# Patient Record
Sex: MALE | Race: WHITE | NOT HISPANIC OR LATINO | Employment: UNEMPLOYED | URBAN - METROPOLITAN AREA
[De-identification: names, ages, dates, MRNs, and addresses within clinical notes are randomized per-mention and may not be internally consistent; named-entity substitution may affect disease eponyms.]

---

## 2020-11-30 ENCOUNTER — HOSPITAL ENCOUNTER (EMERGENCY)
Facility: HOSPITAL | Age: 55
Discharge: HOME | End: 2020-11-30
Attending: NURSE PRACTITIONER | Admitting: EMERGENCY MEDICINE

## 2020-11-30 ENCOUNTER — APPOINTMENT (OUTPATIENT)
Dept: GENERAL RADIOLOGY | Facility: HOSPITAL | Age: 55
End: 2020-11-30

## 2020-11-30 VITALS
WEIGHT: 175.6 LBS | OXYGEN SATURATION: 98 % | BODY MASS INDEX: 26.01 KG/M2 | RESPIRATION RATE: 18 BRPM | HEIGHT: 69 IN | DIASTOLIC BLOOD PRESSURE: 88 MMHG | SYSTOLIC BLOOD PRESSURE: 152 MMHG | HEART RATE: 68 BPM | TEMPERATURE: 98.2 F

## 2020-11-30 DIAGNOSIS — M25.561 PAIN AND SWELLING OF RIGHT KNEE: ICD-10-CM

## 2020-11-30 DIAGNOSIS — M25.461 PAIN AND SWELLING OF RIGHT KNEE: ICD-10-CM

## 2020-11-30 DIAGNOSIS — M25.561 CHRONIC PAIN OF RIGHT KNEE: Primary | ICD-10-CM

## 2020-11-30 DIAGNOSIS — G89.29 CHRONIC PAIN OF RIGHT KNEE: Primary | ICD-10-CM

## 2020-11-30 PROCEDURE — 73564 X-RAY EXAM KNEE 4 OR MORE: CPT

## 2020-11-30 PROCEDURE — 99283 EMERGENCY DEPT VISIT LOW MDM: CPT

## 2020-11-30 RX ORDER — DICLOFENAC SODIUM 75 MG/1
75 TABLET, DELAYED RELEASE ORAL 2 TIMES DAILY PRN
Qty: 20 TABLET | Refills: 0 | Status: SHIPPED | OUTPATIENT
Start: 2020-11-30 | End: 2020-12-30

## 2020-11-30 RX ORDER — HYDROCODONE BITARTRATE AND ACETAMINOPHEN 5; 325 MG/1; MG/1
1 TABLET ORAL ONCE AS NEEDED
Status: DISCONTINUED | OUTPATIENT
Start: 2020-11-30 | End: 2020-11-30 | Stop reason: HOSPADM

## 2020-11-30 RX ADMIN — HYDROCODONE BITARTRATE AND ACETAMINOPHEN 1 TABLET: 5; 325 TABLET ORAL at 17:11

## 2020-12-30 ENCOUNTER — OFFICE VISIT (OUTPATIENT)
Dept: FAMILY MEDICINE CLINIC | Facility: CLINIC | Age: 55
End: 2020-12-30

## 2020-12-30 VITALS
HEIGHT: 67 IN | BODY MASS INDEX: 28.09 KG/M2 | SYSTOLIC BLOOD PRESSURE: 148 MMHG | HEART RATE: 93 BPM | OXYGEN SATURATION: 97 % | TEMPERATURE: 97.3 F | WEIGHT: 179 LBS | DIASTOLIC BLOOD PRESSURE: 95 MMHG

## 2020-12-30 DIAGNOSIS — M25.561 CHRONIC PAIN OF RIGHT KNEE: Primary | ICD-10-CM

## 2020-12-30 DIAGNOSIS — I10 ESSENTIAL HYPERTENSION: ICD-10-CM

## 2020-12-30 DIAGNOSIS — Z12.5 ENCOUNTER FOR SCREENING FOR MALIGNANT NEOPLASM OF PROSTATE: ICD-10-CM

## 2020-12-30 DIAGNOSIS — R06.02 SHORTNESS OF BREATH: ICD-10-CM

## 2020-12-30 DIAGNOSIS — G89.29 CHRONIC PAIN OF RIGHT KNEE: Primary | ICD-10-CM

## 2020-12-30 PROCEDURE — 99203 OFFICE O/P NEW LOW 30 MIN: CPT | Performed by: FAMILY MEDICINE

## 2020-12-30 NOTE — PROGRESS NOTES
"Subjective   Miguel Leung is a 55 y.o. male.     He is in as a new patient to [facility], not having seen a PCP in many years.  He is currently homeless with limited prospects of being able to find shelter.  He is not willing to go to a shelter at this time. He smokes and drinks a little and does not want to stop those activities, which would be necessary to stay in a shelter.  He has been short of breath with even modest activity.  The person with him today indicated he got short of breath walking from the car to our front door.  He denies chest pain or nausea.  He denies dizziness or lightheadedness.  His main complaint is some severe right knee pain that has been present for years.  He said he saw a physician locally that was unable to help him and he would like a referral to a doctor in [place] that he has been advised to see by a friend.  The knee does seem to limit his mobility and make it difficult for him to get around.  He is on disability.  He says the biggest reason he cannot get housing is because he does not have a legal identification document.  He was born in [place] while his father was in the service and he has no birth certificate.  He is on disability and has Medicaid and food stamps, but he is unable to secure housing or even assistance without proper documentation.         /95 (BP Location: Left arm, Patient Position: Sitting, Cuff Size: Large Adult)   Pulse 93   Temp 97.3 °F (36.3 °C) (Infrared)   Ht 170.2 cm (67\")   Wt 81.2 kg (179 lb)   SpO2 97%   BMI 28.04 kg/m²       Chief Complaint   Patient presents with   • Knee Pain     new pt- friend Elida Muhammad is with him today- she would like to get him a referral to Dr.Brent Andrea with [facility] - right knee- has seen Dr. Alvarez and had injection in his knee    • Shortness of Breath     hard time breathing with activity          No current outpatient medications on file.        The following portions of the patient's " history were reviewed and updated as appropriate: allergies, current medications, past family history, past medical history, past social history, past surgical history and problem list.    Review of Systems   Constitutional: Negative for activity change, fatigue and fever.   HENT: Negative for congestion, sinus pressure, sinus pain, sore throat and trouble swallowing.    Eyes: Negative for visual disturbance.   Respiratory: Positive for shortness of breath and wheezing. Negative for chest tightness.    Cardiovascular: Negative for chest pain.   Gastrointestinal: Negative for abdominal distention, abdominal pain, constipation, diarrhea, nausea and vomiting.   Genitourinary: Negative for difficulty urinating, dysuria, frequency and urgency.   Musculoskeletal: Positive for arthralgias and gait problem. Negative for back pain and neck pain.   Neurological: Negative for dizziness, weakness, light-headedness and numbness.   Psychiatric/Behavioral: Negative for agitation, hallucinations and suicidal ideas.       Objective   Physical Exam  Vitals signs and nursing note reviewed.   Eyes:      Conjunctiva/sclera: Conjunctivae normal.      Pupils: Pupils are equal, round, and reactive to light.   Neck:      Musculoskeletal: Neck supple.   Cardiovascular:      Rate and Rhythm: Normal rate and regular rhythm.      Heart sounds: Normal heart sounds.   Pulmonary:      Effort: Pulmonary effort is normal.      Breath sounds: Wheezing present. No rales.   Abdominal:      General: Bowel sounds are normal.      Palpations: Abdomen is soft.      Tenderness: There is no abdominal tenderness. There is no guarding.   Musculoskeletal:      Right lower leg: No edema.      Left lower leg: No edema.      Comments: Severely arthritic and limited in the right knee, using walking stick to help secure his gait   Neurological:      General: No focal deficit present.      Mental Status: He is alert and oriented to person, place, and time.    Psychiatric:         Mood and Affect: Mood normal.           Assessment/Plan   Problems Addressed this Visit     None      Visit Diagnoses     Chronic pain of right knee    -  Primary    Relevant Orders    Ambulatory Referral to Orthopedic Surgery    Shortness of breath        Relevant Orders    Comprehensive metabolic panel    Lipid panel    CBC w AUTO Differential    XR Chest PA & Lateral    Encounter for screening for malignant neoplasm of prostate        Relevant Orders    PSA SCREENING    Essential hypertension          Diagnoses       Codes Comments    Chronic pain of right knee    -  Primary ICD-10-CM: M25.561, G89.29  ICD-9-CM: 719.46, 338.29     Shortness of breath     ICD-10-CM: R06.02  ICD-9-CM: 786.05     Encounter for screening for malignant neoplasm of prostate     ICD-10-CM: Z12.5  ICD-9-CM: V76.44     Essential hypertension     ICD-10-CM: I10  ICD-9-CM: 401.9         I will refer to orthopedics for the knee  I will get a chest x-ray and some labs to look into the shortness of breath  I asked him to quit smoking and curtail drinking  I advised him and his friend on resources they could look into for shelter  I will see him back in 6 weeks or so  Once I see his labs and x-ray I will treat the blood pressure that was elevated  He is to call for new concerns  I hope he will come back for the follow-up

## 2021-12-27 ENCOUNTER — OFFICE VISIT (OUTPATIENT)
Dept: FAMILY MEDICINE CLINIC | Facility: CLINIC | Age: 56
End: 2021-12-27

## 2021-12-27 VITALS
SYSTOLIC BLOOD PRESSURE: 149 MMHG | HEART RATE: 95 BPM | HEIGHT: 67 IN | TEMPERATURE: 98.1 F | WEIGHT: 178 LBS | BODY MASS INDEX: 27.94 KG/M2 | OXYGEN SATURATION: 98 % | DIASTOLIC BLOOD PRESSURE: 90 MMHG

## 2021-12-27 DIAGNOSIS — M25.561 CHRONIC PAIN OF RIGHT KNEE: ICD-10-CM

## 2021-12-27 DIAGNOSIS — Z00.00 PREVENTATIVE HEALTH CARE: Primary | ICD-10-CM

## 2021-12-27 DIAGNOSIS — G89.29 CHRONIC PAIN OF RIGHT KNEE: ICD-10-CM

## 2021-12-27 DIAGNOSIS — I10 ESSENTIAL HYPERTENSION: ICD-10-CM

## 2021-12-27 PROCEDURE — 99214 OFFICE O/P EST MOD 30 MIN: CPT | Performed by: NURSE PRACTITIONER

## 2021-12-27 RX ORDER — LISINOPRIL 10 MG/1
10 TABLET ORAL DAILY
Qty: 30 TABLET | Refills: 0 | Status: SHIPPED | OUTPATIENT
Start: 2021-12-27

## 2021-12-27 RX ORDER — IBUPROFEN 200 MG
400 TABLET ORAL EVERY 6 HOURS PRN
COMMUNITY

## 2021-12-27 NOTE — PROGRESS NOTES
"Subjective     Miguel Leung is a 56 y.o. male.     Pt is here today with c/o of right knee pain and wants blood work completed  He is homeless and has limited means.   He has had chronic right knee pain.  States he used to get cortisone injections in the past.  He states that the pain is constant.  Rates the pain about 7/10.  He has been taking ibuprofen which doesn't really help.   He states that he knee gives out on him. He wears a stability brace on the knee.  Denies numbness or tingling in his foot.   He is smoking 1/2 ppd.  Smokes marijuana occasionally.    HTN- Drinks alcohol about 1-2 days a week. Has about 1-2 pints a week. Has occasional SOA but primarily from knee pain.          The following portions of the patient's history were reviewed and updated as appropriate: allergies, current medications, past family history, past medical history, past social history, past surgical history and problem list.    Review of Systems   Constitutional: Positive for fatigue. Negative for chills and fever.   Respiratory: Positive for shortness of breath. Negative for chest tightness.    Cardiovascular: Negative for chest pain and palpitations.   Gastrointestinal: Negative for abdominal pain, nausea and vomiting.   Musculoskeletal: Positive for arthralgias (right knee).   Neurological: Negative for dizziness, numbness and headache.       Objective     /90 (BP Location: Left arm, Patient Position: Sitting, Cuff Size: Large Adult)   Pulse 95   Temp 98.1 °F (36.7 °C)   Ht 170.2 cm (67\")   Wt 80.7 kg (178 lb)   SpO2 98%   BMI 27.88 kg/m²     Current Outpatient Medications on File Prior to Visit   Medication Sig Dispense Refill   • ibuprofen (ADVIL,MOTRIN) 200 MG tablet Take 400 mg by mouth Every 6 (Six) Hours As Needed for Mild Pain .       No current facility-administered medications on file prior to visit.        Physical Exam  Vitals reviewed.   Constitutional:       General: He is not in acute distress.     " Appearance: Normal appearance. He is well-developed. He is not diaphoretic.      Comments: dissheveled   HENT:      Head: Normocephalic and atraumatic.   Eyes:      General:         Right eye: No discharge.         Left eye: No discharge.      Conjunctiva/sclera: Conjunctivae normal.      Pupils: Pupils are equal, round, and reactive to light.   Cardiovascular:      Rate and Rhythm: Normal rate and regular rhythm.   Pulmonary:      Effort: Pulmonary effort is normal. No respiratory distress.      Breath sounds: Normal breath sounds. No wheezing or rales.   Abdominal:      General: Bowel sounds are normal. There is no distension.      Palpations: Abdomen is soft.      Tenderness: There is no abdominal tenderness.   Musculoskeletal:         General: Tenderness (right medial knee) present. Normal range of motion.      Cervical back: Normal range of motion and neck supple.   Skin:     General: Skin is warm and dry.   Neurological:      General: No focal deficit present.      Mental Status: He is alert and oriented to person, place, and time.   Psychiatric:         Mood and Affect: Mood normal.         Behavior: Behavior normal.         Thought Content: Thought content normal.         Judgment: Judgment normal.           Assessment/Plan     Diagnoses and all orders for this visit:    1. Preventative health care (Primary)  Comments:  decrease alcohol intake and quit smoking  check labs  pt is homeless- has advocate with him today  Orders:  -     Comprehensive metabolic panel; Future  -     Lipid panel; Future  -     Hepatitis C Antibody; Future    2. Chronic pain of right knee  Comments:  chronic issue  cont ibuporfen and tylenol  referral to ortho  Orders:  -     Ambulatory Referral to Orthopedic Surgery    3. Essential hypertension  Comments:  elevated  advised to quit drinking  start lisinopril  check labs  f/u 1 mo  Orders:  -     lisinopril (PRINIVIL,ZESTRIL) 10 MG tablet; Take 1 tablet by mouth Daily.  Dispense: 30  tablet; Refill: 0

## 2021-12-27 NOTE — PATIENT INSTRUCTIONS
Start lisinopril   Quit drinking  Check blood work  Referral to orthopedic doctor  Follow up in 1 mo

## 2022-01-07 ENCOUNTER — OFFICE VISIT (OUTPATIENT)
Dept: ORTHOPEDIC SURGERY | Facility: CLINIC | Age: 57
End: 2022-01-07

## 2022-01-07 VITALS
WEIGHT: 178 LBS | BODY MASS INDEX: 27.94 KG/M2 | HEART RATE: 97 BPM | SYSTOLIC BLOOD PRESSURE: 135 MMHG | HEIGHT: 67 IN | DIASTOLIC BLOOD PRESSURE: 82 MMHG

## 2022-01-07 DIAGNOSIS — M17.12 PRIMARY OSTEOARTHRITIS OF LEFT KNEE: Primary | ICD-10-CM

## 2022-01-07 PROCEDURE — 99204 OFFICE O/P NEW MOD 45 MIN: CPT | Performed by: ORTHOPAEDIC SURGERY

## 2022-01-07 PROCEDURE — 20610 DRAIN/INJ JOINT/BURSA W/O US: CPT | Performed by: ORTHOPAEDIC SURGERY

## 2022-01-07 RX ADMIN — TRIAMCINOLONE ACETONIDE 80 MG: 40 INJECTION, SUSPENSION INTRA-ARTICULAR; INTRAMUSCULAR at 14:00

## 2022-01-07 RX ADMIN — LIDOCAINE HYDROCHLORIDE 2 ML: 10 INJECTION, SOLUTION EPIDURAL; INFILTRATION; INTRACAUDAL; PERINEURAL at 14:00

## 2022-01-07 NOTE — PROGRESS NOTES
Large Joint Arthrocentesis: R knee  Date/Time: 1/7/2022 2:00 PM  Consent given by: patient  Site marked: site marked  Timeout: Immediately prior to procedure a time out was called to verify the correct patient, procedure, equipment, support staff and site/side marked as required   Supporting Documentation  Indications: pain   Procedure Details  Location: knee - R knee  Needle size: 25 G  Medications administered: 80 mg triamcinolone acetonide 40 MG/ML; 2 mL lidocaine PF 1% 1 %  Patient tolerance: patient tolerated the procedure well with no immediate complications

## 2022-01-07 NOTE — PROGRESS NOTES
NEW VISIT    Patient: Miguel Leung  ?  YOB: 1965    MRN: [id number]  ?  Chief Complaint   Patient presents with   • Right Knee - Pain      ?  HPI: Patient presents to the office with increasing pain and discomfort of the right knee.  He has difficulty in going up and down the steps.  The pain is mostly limited to the medial aspect of the knee joint.  There is radiation of pain to the proximal tibia as well.  There is no instability of the knee as such.  He does have a progressive varus deformity of the knee.  Duration of complaints is about 6 to 7 years.  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  He has a very distinct limp at the end of the day.  Cross body activities bother the patient significantly.    Pain Location: right knee(s)  Radiation: none  Quality: dull, aching  Intensity/Severity: moderate  Duration: 6-7 years  Onset quality: gradual   Timing: intermittent  Aggravating Factors: kneeling, rising after sitting and squatting  Alleviating Factors: NSAID's  Previous Episodes: yes  Associated Symptoms: pain, swelling, clicking/popping, deformity  ADL Affected: ambulating  Previous Treatment: Anti-inflammatory medication.    This patient is a new patient.  This problem is new to this examiner.      Allergies: No Known Allergies    Medications:   Home Medications:  Current Outpatient Medications on File Prior to Visit   Medication Sig   • ibuprofen (ADVIL,MOTRIN) 200 MG tablet Take 400 mg by mouth Every 6 (Six) Hours As Needed for Mild Pain .   • lisinopril (PRINIVIL,ZESTRIL) 10 MG tablet Take 1 tablet by mouth Daily.     No current facility-administered medications on file prior to visit.     Current Medications:  Scheduled Meds:  PRN Meds:.    I have reviewed the patient's medical history in detail and updated the computerized patient record.  Review and summarization of old records include:    Past Medical  "History:   Diagnosis Date   • Hypertension    • Meningitis spinal      Past Surgical History:   Procedure Laterality Date   • SPINE SURGERY       Social History     Occupational History   • Not on file   Tobacco Use   • Smoking status: Current Every Day Smoker     Packs/day: 0.50     Types: Cigarettes   • Smokeless tobacco: Never Used   Vaping Use   • Vaping Use: Never used   Substance and Sexual Activity   • Alcohol use: Yes     Comment: occ    • Drug use: Not Currently   • Sexual activity: Defer      Social History     Social History Narrative   • Not on file     Family History   Family history unknown: Yes         Review of Systems  Constitutional: Negative for appetite change.   HENT: Negative.    Eyes: Negative.    Respiratory: Negative.    Cardiovascular: Negative.    Gastrointestinal: Negative.    Endocrine: Negative.    Genitourinary: Negative.    Musculoskeletal: See details of exam below.  Skin: Negative.    Allergic/Immunologic: Negative.    Hematological: Negative.    Psychiatric/Behavioral: Negative.         Wt Readings from Last 3 Encounters:   01/07/22 80.7 kg (178 lb)   12/27/21 80.7 kg (178 lb)   12/30/20 81.2 kg (179 lb)     Ht Readings from Last 3 Encounters:   01/07/22 170.2 cm (67\")   12/27/21 170.2 cm (67\")   12/30/20 170.2 cm (67\")     Body mass index is 27.88 kg/m².  Facility age limit for growth percentiles is 20 years.  Vitals:    01/07/22 1325   BP: 135/82   Pulse: 97         Physical Exam  Constitutional: Patient is oriented to person, place, and time. Appears well-developed and well-nourished.   HENT:   Head: Normocephalic and atraumatic.   Eyes: Conjunctivae and EOM are normal. Pupils are equal, round, and reactive to light.   Cardiovascular: Normal rate, regular rhythm, normal heart sounds and intact distal pulses.   Pulmonary/Chest: Effort normal and breath sounds normal.   Musculoskeletal:   See detailed exam below   Neurological: Alert and oriented to person, place, and time. No " sensory deficit. Coordination normal.   Skin: Skin is warm and dry. Capillary refill takes less than 2 seconds. No rash noted. No erythema.   Psychiatric: Patient has a normal mood and affect. His behavior is normal. Judgment and thought content normal.   Nursing note and vitals reviewed.      Ortho Exam:   Right knee (varus). Patient has crepitus throughout range of motion. Positive patellar grind test. Mild effusion. Lachman is negative. Pivot shift is negative. Anterior and posterior drawer signs are negative. Significant joint line tenderness is noted on the medial aspect of the knee. Patient has a varus orientation of the knee. There is fullness and tenderness in the Popliteal fossa. Mild distention of a Popliteal cyst is noted in this location. Range of motion in flexion is from 0-110 degrees. Neurovascular status is intact.  Dorsalis pedis and posterior tibial artery pulses are palpable. Common peroneal nerve function is well preserved. Patient's gait is cautious and antalgic. Skin and soft tissues are mildly swollen, consistent with synovitis and effusion. The patient has a significant limp with the first few steps after starting the gait cycle. Getting out of a chair takes a lot of effort due to pain on knee flexion.           Diagnostics:  xrays obtained today   right Knee X-Ray  Indication: Pain and discomfort on the medial aspect of the knee.  AP, Lateral views  Findings: There is a distinct loss of articular surface cartilage integrity.  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  no bony lesion  Soft tissues within normal limits  decreased joint spaces  Hardware appropriately positioned not applicable      no prior studies available for comparison.    This patient's x-ray report was graded according to the Kellgren and Ryan classification.  This took into account the joint space narrowing, osteophyte formation, sclerosis of the distal femur/proximal tibia along with  deformity of those bones.  The findings were indicative of K L grade 3.    X-RAY was ordered and reviewed by Rosalio Casiano MD    Assessment:  Diagnoses and all orders for this visit:    1. Primary osteoarthritis of left knee (Primary)          Procedures  ?    Plan    · Compression/brace to the knee to prevent it from buckling and giving out.  · Intra-articular steroid injection administered to the patient to the right knee from an anteromedial approach.  · Calcium and vitamin D for bone health.  · Discussed with the patient about the pathology of spontaneous osteonecrosis and the fact that he is most likely going to need knee replacement surgery.  This replacement surgery could be partial medial compartment knee replacement versus total knee arthroplasty.  · Tablet meloxicam 7.5 mg tab 1 p.o. daily for pain swelling and discomfort.  · Tablet Fosamax 70 mg tab 1 p.o. weekly for helping strengthen the integrity of the bone.  · Rest, ice, compression, and elevation (RICE) therapy  · Stretching and strengthening exercises of the quads and hamstrings.  · Time spent in the office today with the patient is 50 minutes going over treatment options specifically surgical intervention.  · OTC Tylenol 500-1000mg by mouth every 6 hours as needed for pain   · Follow up in 3 month(s)  The patient was seen today for preoperative discussion.  The patient has been tried on over-the-counter and prescription NSAID's despite the risks of anti-inflammatory bleeding, peptic ulcers and erosive gastritis with short term benefit only.  Braces have been prescribed for mechanical support.  Patient has been participating in an exercise program specifically targeting joint pain relief with limited benefit. Intraarticular injections have been used periodically with some but not complete relief of pain.  Ambulation aids have also been utilized.      · The details of the surgical procedure were explained including the location of probable incisions  and a description of the likely hardware/grafts to be used. The patient understands the likely convalescence after surgery as well as the rehabilitation required.  Also, we have thoroughly discussed with the patient the risks, benefits and alternatives to surgery.  Risks include but are not limited to the risk of infection, joint stiffness, limited range of motion, wound healing problems, scar tissue build up, myocardial infarction, stroke, blood clots (including DVT and/or pulmonary embolus along with the risk of death) neurologic and/or vascular injury, limb length discrepancy, fracture, dislocation, nonunion, malunion, continued pain and need for further surgery including hardware failure requiring revision.     Date of encounter: 01/07/2022   Rosalio Casiano MD

## 2022-01-16 RX ORDER — LIDOCAINE HYDROCHLORIDE 10 MG/ML
2 INJECTION, SOLUTION EPIDURAL; INFILTRATION; INTRACAUDAL; PERINEURAL
Status: COMPLETED | OUTPATIENT
Start: 2022-01-07 | End: 2022-01-07

## 2022-01-16 RX ORDER — TRIAMCINOLONE ACETONIDE 40 MG/ML
80 INJECTION, SUSPENSION INTRA-ARTICULAR; INTRAMUSCULAR
Status: COMPLETED | OUTPATIENT
Start: 2022-01-07 | End: 2022-01-07

## 2024-10-17 ENCOUNTER — OFFICE VISIT (OUTPATIENT)
Dept: CARDIOLOGY | Facility: CLINIC | Age: 59
End: 2024-10-17
Payer: MEDICAID

## 2024-10-17 VITALS — OXYGEN SATURATION: 97 % | HEART RATE: 71 BPM | SYSTOLIC BLOOD PRESSURE: 139 MMHG | DIASTOLIC BLOOD PRESSURE: 98 MMHG

## 2024-10-17 DIAGNOSIS — Z86.73 HISTORY OF CVA (CEREBROVASCULAR ACCIDENT): Primary | ICD-10-CM

## 2024-10-17 DIAGNOSIS — E78.5 DYSLIPIDEMIA: ICD-10-CM

## 2024-10-17 DIAGNOSIS — Q21.12 PFO (PATENT FORAMEN OVALE): ICD-10-CM

## 2024-10-17 DIAGNOSIS — I10 ESSENTIAL HYPERTENSION: ICD-10-CM

## 2024-10-17 RX ORDER — HYDROXYZINE PAMOATE 50 MG/1
CAPSULE ORAL
COMMUNITY
Start: 2024-10-06

## 2024-10-17 RX ORDER — SODIUM CHLORIDE 9 MG/ML
80 INJECTION, SOLUTION INTRAVENOUS CONTINUOUS
OUTPATIENT
Start: 2024-10-17 | End: 2024-10-18

## 2024-10-17 RX ORDER — ATORVASTATIN CALCIUM 80 MG/1
TABLET, FILM COATED ORAL
COMMUNITY
Start: 2024-10-04

## 2024-10-17 RX ORDER — NITROGLYCERIN 0.4 MG/1
0.4 TABLET SUBLINGUAL
COMMUNITY

## 2024-10-17 NOTE — PROGRESS NOTES
Cardiology Consult Note    Patient Identification:  Name: Miguel Leung  Age: 59 y.o.  Sex: male  :  1965  MRN: [id number]             Requesting Physician :  Sasha Devries APRN     Reason for Consultation / Chief Complaint :   Cardiac source of emboli for CVA    History of Present Illness:    Mr. Miguel Leung has PMH of    CVA  Hole in the heart possible PFO  Hypertension  History of drug abuse and homelessness  Current cigarette smoker    Sent here for evaluation of hole in the heart closure.  Patient is a poor historian.  Came from [facility] in [place] which reportedly is a shelter.  Patient does not know what medications he takes.  Patient denies any chest pain or shortness of breath.  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  Patient reportedly had a Monitor which did not reveal A-fib an echo which showed a hole in the heart probably PFO was recommended to have the hole closed.    Patient's arterial blood pressure is 139/98, heart rate 71, O2 sat of 97% on room air.      Assessment:  :    CVA  Hypertension  Cigarette smoker  Hole in the heart, possible PFO      Recommendations / Plan:        Will get records from [facility]  Reviewed EKG results with patient.  Counseled on smoking cessation.  Unfortunately patient does not know what medications he takes and he is facility has not sent any medications.  Will get records from [facility].  Will schedule a NATALIE to evalvate his hole in the heart.  Risk-benefit alternatives explained  Will check venous Dopplers.  Will follow-up after getting records and testing to consider further evaluation treatment.           Diagnosis Plan   1. History of CVA (cerebrovascular accident)  Duplex Venous Lower Extremity - Bilateral CAR    Adult Transesophageal Echo (NATALIE) W/ Cont if Necessary Per Protocol    sodium chloride 0.9 % infusion      2. Essential  hypertension  Duplex Venous Lower Extremity - Bilateral CAR    Adult Transesophageal Echo (NATALIE) W/ Cont if Necessary Per Protocol    sodium chloride 0.9 % infusion      3. Dyslipidemia  Duplex Venous Lower Extremity - Bilateral CAR    Adult Transesophageal Echo (NATALIE) W/ Cont if Necessary Per Protocol    sodium chloride 0.9 % infusion      4. PFO (patent foramen ovale)  Duplex Venous Lower Extremity - Bilateral CAR    Adult Transesophageal Echo (NATALIE) W/ Cont if Necessary Per Protocol    sodium chloride 0.9 % infusion                 Past Medical History:  Past Medical History:   Diagnosis Date    Hypertension     Meningitis spinal     Stroke      Past Surgical History:  Past Surgical History:   Procedure Laterality Date    SPINE SURGERY        Allergies:  No Known Allergies  Home Meds:  (Not in a hospital admission)    Current Meds:     Current Outpatient Medications:     atorvastatin (LIPITOR) 80 MG tablet, , Disp: , Rfl:     hydrOXYzine pamoate (VISTARIL) 50 MG capsule, , Disp: , Rfl:     nitroglycerin (NITROSTAT) 0.4 MG SL tablet, Place 1 tablet under the tongue Every 5 (Five) Minutes As Needed for Chest Pain. Take no more than 3 doses in 15 minutes., Disp: , Rfl:     ibuprofen (ADVIL,MOTRIN) 200 MG tablet, Take 400 mg by mouth Every 6 (Six) Hours As Needed for Mild Pain ., Disp: , Rfl:     lisinopril (PRINIVIL,ZESTRIL) 10 MG tablet, Take 1 tablet by mouth Daily., Disp: 30 tablet, Rfl: 0  Social History:   Social History     Tobacco Use    Smoking status: Every Day     Current packs/day: 0.50     Types: Cigarettes     Passive exposure: Current    Smokeless tobacco: Never   Substance Use Topics    Alcohol use: Yes     Comment: occ       Family History:  Family History   Family history unknown: Yes        Review of Systems : Review of Systems   Constitutional: Negative for malaise/fatigue.   Cardiovascular:  Negative for chest pain, dyspnea on exertion, leg swelling and palpitations.   Respiratory:  Negative for  cough and shortness of breath.    Gastrointestinal:  Negative for abdominal pain, nausea and vomiting.   Neurological:  Negative for dizziness, focal weakness, headaches, light-headedness and numbness.   All other systems reviewed and are negative.                Constitutional:  Heart Rate:  [71] 71  BP: (139)/(98) 139/98    Physical Exam   /98 (BP Location: Left arm, Patient Position: Sitting, Cuff Size: Large Adult)   Pulse 71   SpO2 97%   Physical Exam  General:  Appears in no acute distress  Eyes: Sclerae are anicteric,  conjunctivae are clear   HEENT:  No JVD. Thyroid not visibly enlarged. No mucosal pallor or cyanosis  Respiratory: Respirations regular and unlabored at rest.  Bilaterally good breath sounds with good air entry in all fields. No crackles, rubs or wheezes auscultated  Cardiovascular: S1,S2 Regular rate and rhythm. No murmur, rub or gallop auscultated. No pretibial pitting edema  Gastrointestinal: Abdomen soft, flat, nontender. Bowel sounds present.   Musculoskeletal:  No abnormal movements  Extremities: No digital clubbing or cyanosis  Skin: Color pink. Skin warm and dry to touch. No rashes  No xanthoma  Neuro: Alert and awake, no lateralizing deficits appreciated    Cardiographics  ECG: EKG tracing was  personally reviewed/interpreted by me    ECG 12 Lead    Date/Time: 10/17/2024 4:55 PM  Performed by: Dario Gaona MD    Authorized by: Dario Gaona MD  Comparison: not compared with previous ECG   Previous ECG: no previous ECG available  Comments: EKG done today reviewed/interpreted by me reveals sinus rhythm with a rate of 69 bpm, no comparison EKG available.             Telemetry:     Echocardiogram:       Imaging  Chest X-ray:   Imaging Results (Last 24 Hours)       ** No results found for the last 24 hours. **            Lab Review: I have reviewed the labs                                      Dario Gaona MD  10/17/2024, 16:55 EDT      EMR  Dragon/Transcription:   Dictated utilizing Dragon dictation

## 2024-10-17 NOTE — H&P (VIEW-ONLY)
Cardiology Consult Note    Patient Identification:  Name: Miguel Leung  Age: 59 y.o.  Sex: male  :  1965  MRN: [id number]             Requesting Physician :  Sasha Devries APRN     Reason for Consultation / Chief Complaint :   Cardiac source of emboli for CVA    History of Present Illness:    Mr. Miguel Leung has PMH of    CVA  Hole in the heart possible PFO  Hypertension  History of drug abuse and homelessness  Current cigarette smoker    Sent here for evaluation of hole in the heart closure.  Patient is a poor historian.  Came from [facility] in [place] which reportedly is a shelter.  Patient does not know what medications he takes.  Patient denies any chest pain or shortness of breath.  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  Patient reportedly had a Monitor which did not reveal A-fib an echo which showed a hole in the heart probably PFO was recommended to have the hole closed.    Patient's arterial blood pressure is 139/98, heart rate 71, O2 sat of 97% on room air.      Assessment:  :    CVA  Hypertension  Cigarette smoker  Hole in the heart, possible PFO      Recommendations / Plan:        Will get records from [facility]  Reviewed EKG results with patient.  Counseled on smoking cessation.  Unfortunately patient does not know what medications he takes and he is facility has not sent any medications.  Will get records from [facility].  Will schedule a NATALIE to evalvate his hole in the heart.  Risk-benefit alternatives explained  Will check venous Dopplers.  Will follow-up after getting records and testing to consider further evaluation treatment.           Diagnosis Plan   1. History of CVA (cerebrovascular accident)  Duplex Venous Lower Extremity - Bilateral CAR    Adult Transesophageal Echo (NATALIE) W/ Cont if Necessary Per Protocol    sodium chloride 0.9 % infusion      2. Essential  hypertension  Duplex Venous Lower Extremity - Bilateral CAR    Adult Transesophageal Echo (NATALIE) W/ Cont if Necessary Per Protocol    sodium chloride 0.9 % infusion      3. Dyslipidemia  Duplex Venous Lower Extremity - Bilateral CAR    Adult Transesophageal Echo (NATALIE) W/ Cont if Necessary Per Protocol    sodium chloride 0.9 % infusion      4. PFO (patent foramen ovale)  Duplex Venous Lower Extremity - Bilateral CAR    Adult Transesophageal Echo (NATALIE) W/ Cont if Necessary Per Protocol    sodium chloride 0.9 % infusion                 Past Medical History:  Past Medical History:   Diagnosis Date    Hypertension     Meningitis spinal     Stroke      Past Surgical History:  Past Surgical History:   Procedure Laterality Date    SPINE SURGERY        Allergies:  No Known Allergies  Home Meds:  (Not in a hospital admission)    Current Meds:     Current Outpatient Medications:     atorvastatin (LIPITOR) 80 MG tablet, , Disp: , Rfl:     hydrOXYzine pamoate (VISTARIL) 50 MG capsule, , Disp: , Rfl:     nitroglycerin (NITROSTAT) 0.4 MG SL tablet, Place 1 tablet under the tongue Every 5 (Five) Minutes As Needed for Chest Pain. Take no more than 3 doses in 15 minutes., Disp: , Rfl:     ibuprofen (ADVIL,MOTRIN) 200 MG tablet, Take 400 mg by mouth Every 6 (Six) Hours As Needed for Mild Pain ., Disp: , Rfl:     lisinopril (PRINIVIL,ZESTRIL) 10 MG tablet, Take 1 tablet by mouth Daily., Disp: 30 tablet, Rfl: 0  Social History:   Social History     Tobacco Use    Smoking status: Every Day     Current packs/day: 0.50     Types: Cigarettes     Passive exposure: Current    Smokeless tobacco: Never   Substance Use Topics    Alcohol use: Yes     Comment: occ       Family History:  Family History   Family history unknown: Yes        Review of Systems : Review of Systems   Constitutional: Negative for malaise/fatigue.   Cardiovascular:  Negative for chest pain, dyspnea on exertion, leg swelling and palpitations.   Respiratory:  Negative for  cough and shortness of breath.    Gastrointestinal:  Negative for abdominal pain, nausea and vomiting.   Neurological:  Negative for dizziness, focal weakness, headaches, light-headedness and numbness.   All other systems reviewed and are negative.                Constitutional:  Heart Rate:  [71] 71  BP: (139)/(98) 139/98    Physical Exam   /98 (BP Location: Left arm, Patient Position: Sitting, Cuff Size: Large Adult)   Pulse 71   SpO2 97%   Physical Exam  General:  Appears in no acute distress  Eyes: Sclerae are anicteric,  conjunctivae are clear   HEENT:  No JVD. Thyroid not visibly enlarged. No mucosal pallor or cyanosis  Respiratory: Respirations regular and unlabored at rest.  Bilaterally good breath sounds with good air entry in all fields. No crackles, rubs or wheezes auscultated  Cardiovascular: S1,S2 Regular rate and rhythm. No murmur, rub or gallop auscultated. No pretibial pitting edema  Gastrointestinal: Abdomen soft, flat, nontender. Bowel sounds present.   Musculoskeletal:  No abnormal movements  Extremities: No digital clubbing or cyanosis  Skin: Color pink. Skin warm and dry to touch. No rashes  No xanthoma  Neuro: Alert and awake, no lateralizing deficits appreciated    Cardiographics  ECG: EKG tracing was  personally reviewed/interpreted by me    ECG 12 Lead    Date/Time: 10/17/2024 4:55 PM  Performed by: Dario Gaona MD    Authorized by: Dario Gaona MD  Comparison: not compared with previous ECG   Previous ECG: no previous ECG available  Comments: EKG done today reviewed/interpreted by me reveals sinus rhythm with a rate of 69 bpm, no comparison EKG available.             Telemetry:     Echocardiogram:       Imaging  Chest X-ray:   Imaging Results (Last 24 Hours)       ** No results found for the last 24 hours. **            Lab Review: I have reviewed the labs                                      Dario Gaona MD  10/17/2024, 16:55 EDT      EMR  Dragon/Transcription:   Dictated utilizing Dragon dictation

## 2024-10-30 ENCOUNTER — TELEPHONE (OUTPATIENT)
Dept: CARDIOLOGY | Facility: CLINIC | Age: 59
End: 2024-10-30
Payer: MEDICAID

## 2024-10-30 ENCOUNTER — HOSPITAL ENCOUNTER (OUTPATIENT)
Dept: CARDIOLOGY | Facility: HOSPITAL | Age: 59
Discharge: HOME | End: 2024-10-30
Admitting: INTERNAL MEDICINE
Payer: MEDICAID

## 2024-10-30 DIAGNOSIS — E78.5 DYSLIPIDEMIA: ICD-10-CM

## 2024-10-30 DIAGNOSIS — Q21.12 PFO (PATENT FORAMEN OVALE): ICD-10-CM

## 2024-10-30 DIAGNOSIS — I10 ESSENTIAL HYPERTENSION: ICD-10-CM

## 2024-10-30 DIAGNOSIS — Z86.73 HISTORY OF CVA (CEREBROVASCULAR ACCIDENT): ICD-10-CM

## 2024-10-30 LAB
BH CV LOW VAS LEFT GASTRONEMIUS VESSEL: 1
BH CV LOWER VASCULAR LEFT COMMON FEMORAL AUGMENT: NORMAL
BH CV LOWER VASCULAR LEFT COMMON FEMORAL COMPETENT: NORMAL
BH CV LOWER VASCULAR LEFT COMMON FEMORAL COMPRESS: NORMAL
BH CV LOWER VASCULAR LEFT COMMON FEMORAL PHASIC: NORMAL
BH CV LOWER VASCULAR LEFT COMMON FEMORAL SPONT: NORMAL
BH CV LOWER VASCULAR LEFT DISTAL FEMORAL COMPRESS: NORMAL
BH CV LOWER VASCULAR LEFT GASTRONEMIUS COMPRESS: NORMAL
BH CV LOWER VASCULAR LEFT GASTRONEMIUS THROMBUS: NORMAL
BH CV LOWER VASCULAR LEFT GREATER SAPH AK COMPRESS: NORMAL
BH CV LOWER VASCULAR LEFT GREATER SAPH BK COMPRESS: NORMAL
BH CV LOWER VASCULAR LEFT LESSER SAPH COMPRESS: NORMAL
BH CV LOWER VASCULAR LEFT MID FEMORAL AUGMENT: NORMAL
BH CV LOWER VASCULAR LEFT MID FEMORAL COMPETENT: NORMAL
BH CV LOWER VASCULAR LEFT MID FEMORAL COMPRESS: NORMAL
BH CV LOWER VASCULAR LEFT MID FEMORAL PHASIC: NORMAL
BH CV LOWER VASCULAR LEFT MID FEMORAL SPONT: NORMAL
BH CV LOWER VASCULAR LEFT PERONEAL COMPRESS: NORMAL
BH CV LOWER VASCULAR LEFT POPLITEAL AUGMENT: NORMAL
BH CV LOWER VASCULAR LEFT POPLITEAL COMPETENT: NORMAL
BH CV LOWER VASCULAR LEFT POPLITEAL COMPRESS: NORMAL
BH CV LOWER VASCULAR LEFT POPLITEAL PHASIC: NORMAL
BH CV LOWER VASCULAR LEFT POPLITEAL SPONT: NORMAL
BH CV LOWER VASCULAR LEFT POSTERIOR TIBIAL COMPRESS: NORMAL
BH CV LOWER VASCULAR LEFT PROXIMAL FEMORAL COMPRESS: NORMAL
BH CV LOWER VASCULAR LEFT SAPHENOFEMORAL JUNCTION COMPRESS: NORMAL
BH CV LOWER VASCULAR RIGHT COMMON FEMORAL AUGMENT: NORMAL
BH CV LOWER VASCULAR RIGHT COMMON FEMORAL COMPETENT: NORMAL
BH CV LOWER VASCULAR RIGHT COMMON FEMORAL COMPRESS: NORMAL
BH CV LOWER VASCULAR RIGHT COMMON FEMORAL PHASIC: NORMAL
BH CV LOWER VASCULAR RIGHT COMMON FEMORAL SPONT: NORMAL
BH CV LOWER VASCULAR RIGHT DISTAL FEMORAL COMPRESS: NORMAL
BH CV LOWER VASCULAR RIGHT GASTRONEMIUS COMPRESS: NORMAL
BH CV LOWER VASCULAR RIGHT GREATER SAPH AK COMPRESS: NORMAL
BH CV LOWER VASCULAR RIGHT GREATER SAPH BK COMPRESS: NORMAL
BH CV LOWER VASCULAR RIGHT LESSER SAPH COMPRESS: NORMAL
BH CV LOWER VASCULAR RIGHT MID FEMORAL AUGMENT: NORMAL
BH CV LOWER VASCULAR RIGHT MID FEMORAL COMPETENT: NORMAL
BH CV LOWER VASCULAR RIGHT MID FEMORAL COMPRESS: NORMAL
BH CV LOWER VASCULAR RIGHT MID FEMORAL PHASIC: NORMAL
BH CV LOWER VASCULAR RIGHT MID FEMORAL SPONT: NORMAL
BH CV LOWER VASCULAR RIGHT PERONEAL COMPRESS: NORMAL
BH CV LOWER VASCULAR RIGHT POPLITEAL AUGMENT: NORMAL
BH CV LOWER VASCULAR RIGHT POPLITEAL COMPETENT: NORMAL
BH CV LOWER VASCULAR RIGHT POPLITEAL COMPRESS: NORMAL
BH CV LOWER VASCULAR RIGHT POPLITEAL PHASIC: NORMAL
BH CV LOWER VASCULAR RIGHT POPLITEAL SPONT: NORMAL
BH CV LOWER VASCULAR RIGHT POSTERIOR TIBIAL COMPRESS: NORMAL
BH CV LOWER VASCULAR RIGHT PROXIMAL FEMORAL COMPRESS: NORMAL
BH CV LOWER VASCULAR RIGHT SAPHENOFEMORAL JUNCTION COMPRESS: NORMAL
BH CV VAS PRELIMINARY FINDINGS SCRIPTING: 1

## 2024-10-30 PROCEDURE — 93970 EXTREMITY STUDY: CPT | Performed by: SURGERY

## 2024-10-30 PROCEDURE — 93970 EXTREMITY STUDY: CPT

## 2024-10-30 RX ORDER — APIXABAN 5 MG (74)
5 KIT ORAL SEE ADMIN INSTRUCTIONS
Qty: 74 TABLET | Refills: 0 | Status: SHIPPED | OUTPATIENT
Start: 2024-10-30

## 2024-10-30 NOTE — TELEPHONE ENCOUNTER
Called patient 3 times was hung up on the first time.no answer other 2 times.  Left message to return call regarding testing from today.      Patient has blood clot in his leg and needs anticoagulation      Called other number listed under patient's name which is some facility which he is not at.      Called emergency contact Verena which is patient's friend and on emergency contact release form she reports that patient does not have a phone currently.    Informed her of DVT need for anticoagulation  Eliquis starter pack sent to patient's pharmacy.      Discussed NATALIE that is scheduled on 11/8/2024

## 2024-11-08 ENCOUNTER — TELEPHONE (OUTPATIENT)
Dept: CARDIOLOGY | Facility: CLINIC | Age: 59
End: 2024-11-08
Payer: MEDICAID

## 2024-11-08 ENCOUNTER — HOSPITAL ENCOUNTER (OUTPATIENT)
Dept: CARDIOLOGY | Facility: HOSPITAL | Age: 59
Discharge: HOME | End: 2024-11-08
Payer: MEDICAID

## 2024-11-08 ENCOUNTER — ANESTHESIA EVENT (OUTPATIENT)
Dept: CARDIOLOGY | Facility: HOSPITAL | Age: 59
End: 2024-11-08
Payer: MEDICAID

## 2024-11-08 ENCOUNTER — ANESTHESIA (OUTPATIENT)
Dept: CARDIOLOGY | Facility: HOSPITAL | Age: 59
End: 2024-11-08
Payer: MEDICAID

## 2024-11-08 VITALS
BODY MASS INDEX: 29.05 KG/M2 | RESPIRATION RATE: 16 BRPM | HEART RATE: 65 BPM | HEIGHT: 65 IN | DIASTOLIC BLOOD PRESSURE: 93 MMHG | TEMPERATURE: 97.8 F | OXYGEN SATURATION: 100 % | WEIGHT: 174.38 LBS | SYSTOLIC BLOOD PRESSURE: 153 MMHG

## 2024-11-08 DIAGNOSIS — Z86.73 HISTORY OF CVA (CEREBROVASCULAR ACCIDENT): ICD-10-CM

## 2024-11-08 DIAGNOSIS — I10 ESSENTIAL HYPERTENSION: ICD-10-CM

## 2024-11-08 DIAGNOSIS — E78.5 DYSLIPIDEMIA: ICD-10-CM

## 2024-11-08 DIAGNOSIS — Q21.12 PFO (PATENT FORAMEN OVALE): ICD-10-CM

## 2024-11-08 DIAGNOSIS — I63.9 CRYPTOGENIC STROKE: Primary | Chronic | ICD-10-CM

## 2024-11-08 LAB
ANION GAP SERPL CALCULATED.3IONS-SCNC: 8.9 MMOL/L (ref 5–15)
BH CV ECHO SHUNT ASSESSMENT PERFORMED (HIDDEN SCRIPTING): 1
BUN SERPL-MCNC: 10 MG/DL (ref 8–23)
BUN/CREAT SERPL: 17.5 (ref 7–25)
CALCIUM SPEC-SCNC: 8.3 MG/DL (ref 8.6–10.5)
CHLORIDE SERPL-SCNC: 107 MMOL/L (ref 98–107)
CO2 SERPL-SCNC: 24.1 MMOL/L (ref 22–29)
CREAT SERPL-MCNC: 0.57 MG/DL (ref 0.76–1.27)
EGFRCR SERPLBLD CKD-EPI 2021: 111.5 ML/MIN/1.73
GLUCOSE SERPL-MCNC: 89 MG/DL (ref 65–99)
INR PPP: 0.87 (ref 0.9–1.1)
POTASSIUM SERPL-SCNC: 4.2 MMOL/L (ref 3.5–5.2)
PROTHROMBIN TIME: 11.9 SECONDS (ref 11.7–14.2)
SODIUM SERPL-SCNC: 140 MMOL/L (ref 136–145)

## 2024-11-08 PROCEDURE — 93321 DOPPLER ECHO F-UP/LMTD STD: CPT

## 2024-11-08 PROCEDURE — 25010000002 LIDOCAINE PF 2% 2 % SOLUTION: Performed by: NURSE ANESTHETIST, CERTIFIED REGISTERED

## 2024-11-08 PROCEDURE — 93325 DOPPLER ECHO COLOR FLOW MAPG: CPT

## 2024-11-08 PROCEDURE — 93312 ECHO TRANSESOPHAGEAL: CPT

## 2024-11-08 PROCEDURE — 80048 BASIC METABOLIC PNL TOTAL CA: CPT | Performed by: INTERNAL MEDICINE

## 2024-11-08 PROCEDURE — 93320 DOPPLER ECHO COMPLETE: CPT

## 2024-11-08 PROCEDURE — 25810000003 SODIUM CHLORIDE 0.9 % SOLUTION: Performed by: INTERNAL MEDICINE

## 2024-11-08 PROCEDURE — 85610 PROTHROMBIN TIME: CPT | Performed by: INTERNAL MEDICINE

## 2024-11-08 PROCEDURE — 25010000002 PROPOFOL 10 MG/ML EMULSION: Performed by: NURSE ANESTHETIST, CERTIFIED REGISTERED

## 2024-11-08 RX ORDER — HYDRALAZINE HYDROCHLORIDE 20 MG/ML
5 INJECTION INTRAMUSCULAR; INTRAVENOUS
OUTPATIENT
Start: 2024-11-08

## 2024-11-08 RX ORDER — NALOXONE HCL 0.4 MG/ML
0.4 VIAL (ML) INJECTION AS NEEDED
OUTPATIENT
Start: 2024-11-08

## 2024-11-08 RX ORDER — LIDOCAINE HYDROCHLORIDE 20 MG/ML
INJECTION, SOLUTION EPIDURAL; INFILTRATION; INTRACAUDAL; PERINEURAL AS NEEDED
Status: DISCONTINUED | OUTPATIENT
Start: 2024-11-08 | End: 2024-11-08 | Stop reason: SURG

## 2024-11-08 RX ORDER — SODIUM CHLORIDE 9 MG/ML
80 INJECTION, SOLUTION INTRAVENOUS CONTINUOUS
Status: DISCONTINUED | OUTPATIENT
Start: 2024-11-08 | End: 2024-11-09 | Stop reason: HOSPADM

## 2024-11-08 RX ORDER — ACETAMINOPHEN 325 MG/1
650 TABLET ORAL ONCE AS NEEDED
OUTPATIENT
Start: 2024-11-08

## 2024-11-08 RX ORDER — PROPOFOL 10 MG/ML
VIAL (ML) INTRAVENOUS AS NEEDED
Status: DISCONTINUED | OUTPATIENT
Start: 2024-11-08 | End: 2024-11-08 | Stop reason: SURG

## 2024-11-08 RX ORDER — LABETALOL HYDROCHLORIDE 5 MG/ML
5 INJECTION, SOLUTION INTRAVENOUS
OUTPATIENT
Start: 2024-11-08

## 2024-11-08 RX ORDER — ONDANSETRON 2 MG/ML
4 INJECTION INTRAMUSCULAR; INTRAVENOUS ONCE AS NEEDED
OUTPATIENT
Start: 2024-11-08

## 2024-11-08 RX ORDER — ACETAMINOPHEN 650 MG/1
650 SUPPOSITORY RECTAL EVERY 4 HOURS PRN
OUTPATIENT
Start: 2024-11-08

## 2024-11-08 RX ORDER — ALBUTEROL SULFATE 0.83 MG/ML
2.5 SOLUTION RESPIRATORY (INHALATION) ONCE AS NEEDED
OUTPATIENT
Start: 2024-11-08

## 2024-11-08 RX ORDER — DIPHENHYDRAMINE HYDROCHLORIDE 50 MG/ML
12.5 INJECTION INTRAMUSCULAR; INTRAVENOUS
OUTPATIENT
Start: 2024-11-08

## 2024-11-08 RX ADMIN — LIDOCAINE HYDROCHLORIDE 70 MG: 20 INJECTION, SOLUTION EPIDURAL; INFILTRATION; INTRACAUDAL; PERINEURAL at 10:56

## 2024-11-08 RX ADMIN — PROPOFOL 40 MG: 10 INJECTION, EMULSION INTRAVENOUS at 11:00

## 2024-11-08 RX ADMIN — PROPOFOL 70 MG: 10 INJECTION, EMULSION INTRAVENOUS at 10:56

## 2024-11-08 RX ADMIN — SODIUM CHLORIDE: 9 INJECTION, SOLUTION INTRAVENOUS at 10:44

## 2024-11-08 NOTE — DISCHARGE INSTRUCTIONS
NATALIE DC Instructions  The medication, which was used to put you to sleep, will be acting in your body for the next twenty-four (24) hours, so you might feel a little sleepy; this feeling will slowly wear off.  Because the medicine is still in your system for the next twenty-four (24) hours:  Do not drive a car, operate machinery, or power tools  Do not drink any alcoholic drinks (not even beer)  Make any important decisions such as to sign important papers    We strongly suggest that a responsible adult be with the patient the rest of the day.    What to do for pain: acetaminophen (Tylenol) and eating cool, soothing foods (e.g. ice cream, smoothies) can help with a sore throat. If your pain is unmanageable with these methods, call the Cardiologist's office.     It may be better to start with liquids such as water, then soups, and graduate up to solid foods  If medication was used to numb your throat, do not eat or drink anything for 2 hours.     Call the cardiologist with any problems of:  Excessive mucus  Spitting up blood  Sore throat more than 72 hours    Go to the nearest Emergency Department if you're vomiting blood or having difficulty breathing.

## 2024-11-08 NOTE — ANESTHESIA PREPROCEDURE EVALUATION
Anesthesia Evaluation     Patient summary reviewed   no history of anesthetic complications:   NPO Solid Status: > 8 hours  NPO Liquid Status: > 8 hours           Airway   Dental      Pulmonary    (+) a smoker Current,  Cardiovascular     ECG reviewed    (+) hypertension      Neuro/Psych  (+) CVA  GI/Hepatic/Renal/Endo      Musculoskeletal     Abdominal    Substance History      OB/GYN          Other   arthritis,                   Anesthesia Plan    ASA 3     general   total IV anesthesia  intravenous induction     Anesthetic plan, risks, benefits, and alternatives have been provided, discussed and informed consent has been obtained with: patient.    Plan discussed with CRNA.    CODE STATUS:

## 2024-11-08 NOTE — TELEPHONE ENCOUNTER
Patient had NATALIE today    Please schedule PFO closure on a Tuesday when Dr. Navarrete is available to do ICE with Dr. Gaona .

## 2024-11-08 NOTE — ANESTHESIA POSTPROCEDURE EVALUATION
Patient: Miguel Leung    Procedure Summary       Date: 11/08/24 Room / Location: [facility]    Anesthesia Start: 1054 Anesthesia Stop: 1104    Procedure: ADULT TRANSESOPHAGEAL ECHO (NATALIE) W/ CONT IF NECESSARY PER PROTOCOL Diagnosis:       History of CVA (cerebrovascular accident)      Essential hypertension      Dyslipidemia      PFO (patent foramen ovale)      (Cardiac Source of Emboli)    Scheduled Providers: Dario Gaona MD Provider: Verena Bustamante MD    Anesthesia Type: general ASA Status: 3            Anesthesia Type: general    Vitals  Vitals Value Taken Time   /93 11/08/24 1145   Temp     Pulse 67 11/08/24 1152   Resp     SpO2 100 % 11/08/24 1149   Vitals shown include unfiled device data.        Post Anesthesia Care and Evaluation    Patient location during evaluation: PACU  Patient participation: complete - patient participated  Level of consciousness: awake and alert  Pain management: satisfactory to patient    Airway patency: patent  Anesthetic complications: No anesthetic complications  PONV Status: none  Cardiovascular status: acceptable  Respiratory status: acceptable  Hydration status: acceptable

## 2024-11-11 NOTE — TELEPHONE ENCOUNTER
Patient is on Eliquis. Does this need to be stopped prior to procedure? Currently scheduled for 12/10. This is the first available date with Dr. Navarrete.

## 2024-11-12 ENCOUNTER — TELEPHONE (OUTPATIENT)
Dept: CARDIOLOGY | Facility: CLINIC | Age: 59
End: 2024-11-12
Payer: MEDICAID

## 2024-11-12 NOTE — TELEPHONE ENCOUNTER
LM for a call back to schedule patient for PFO closure. Procedure is already scheduled for 12/10 but I need to speak to patient and his caregiver. Left my direct number for them to call back.

## 2024-12-10 ENCOUNTER — HOSPITAL ENCOUNTER (OUTPATIENT)
Facility: HOSPITAL | Age: 59
Discharge: HOME | End: 2024-12-11
Attending: INTERNAL MEDICINE | Admitting: INTERNAL MEDICINE
Payer: MEDICAID

## 2024-12-10 ENCOUNTER — APPOINTMENT (OUTPATIENT)
Dept: GENERAL RADIOLOGY | Facility: HOSPITAL | Age: 59
End: 2024-12-10
Payer: MEDICAID

## 2024-12-10 DIAGNOSIS — I82.4Y2 ACUTE DEEP VEIN THROMBOSIS (DVT) OF PROXIMAL VEIN OF LEFT LOWER EXTREMITY: Primary | ICD-10-CM

## 2024-12-10 DIAGNOSIS — I63.9 CRYPTOGENIC STROKE: ICD-10-CM

## 2024-12-10 DIAGNOSIS — Q21.12 PFO (PATENT FORAMEN OVALE): ICD-10-CM

## 2024-12-10 LAB
ALBUMIN SERPL-MCNC: 4 G/DL (ref 3.5–5.2)
ALBUMIN/GLOB SERPL: 0.9 G/DL
ALP SERPL-CCNC: 92 U/L (ref 39–117)
ALT SERPL W P-5'-P-CCNC: 10 U/L (ref 1–41)
ANION GAP SERPL CALCULATED.3IONS-SCNC: 11.1 MMOL/L (ref 5–15)
AST SERPL-CCNC: 21 U/L (ref 1–40)
BASOPHILS # BLD AUTO: 0.04 10*3/MM3 (ref 0–0.2)
BASOPHILS NFR BLD AUTO: 0.3 % (ref 0–1.5)
BILIRUB SERPL-MCNC: 1.3 MG/DL (ref 0–1.2)
BUN SERPL-MCNC: 9 MG/DL (ref 8–23)
BUN/CREAT SERPL: 14.1 (ref 7–25)
CALCIUM SPEC-SCNC: 9.2 MG/DL (ref 8.6–10.5)
CHLORIDE SERPL-SCNC: 105 MMOL/L (ref 98–107)
CO2 SERPL-SCNC: 23.9 MMOL/L (ref 22–29)
CREAT SERPL-MCNC: 0.64 MG/DL (ref 0.76–1.27)
DEPRECATED RDW RBC AUTO: 46.5 FL (ref 37–54)
EGFRCR SERPLBLD CKD-EPI 2021: 107.7 ML/MIN/1.73
EOSINOPHIL # BLD AUTO: 0.13 10*3/MM3 (ref 0–0.4)
EOSINOPHIL NFR BLD AUTO: 1 % (ref 0.3–6.2)
ERYTHROCYTE [DISTWIDTH] IN BLOOD BY AUTOMATED COUNT: 13.7 % (ref 12.3–15.4)
GLOBULIN UR ELPH-MCNC: 4.7 GM/DL
GLUCOSE SERPL-MCNC: 95 MG/DL (ref 65–99)
HCT VFR BLD AUTO: 48.5 % (ref 37.5–51)
HGB BLD-MCNC: 16.2 G/DL (ref 13–17.7)
IMM GRANULOCYTES # BLD AUTO: 0.09 10*3/MM3 (ref 0–0.05)
IMM GRANULOCYTES NFR BLD AUTO: 0.7 % (ref 0–0.5)
INR PPP: 0.99 (ref 0.9–1.1)
LYMPHOCYTES # BLD AUTO: 2.08 10*3/MM3 (ref 0.7–3.1)
LYMPHOCYTES NFR BLD AUTO: 16.4 % (ref 19.6–45.3)
MCH RBC QN AUTO: 30.9 PG (ref 26.6–33)
MCHC RBC AUTO-ENTMCNC: 33.4 G/DL (ref 31.5–35.7)
MCV RBC AUTO: 92.4 FL (ref 79–97)
MONOCYTES # BLD AUTO: 0.95 10*3/MM3 (ref 0.1–0.9)
MONOCYTES NFR BLD AUTO: 7.5 % (ref 5–12)
NEUTROPHILS NFR BLD AUTO: 74.1 % (ref 42.7–76)
NEUTROPHILS NFR BLD AUTO: 9.37 10*3/MM3 (ref 1.7–7)
NRBC BLD AUTO-RTO: 0 /100 WBC (ref 0–0.2)
PLATELET # BLD AUTO: 348 10*3/MM3 (ref 140–450)
PMV BLD AUTO: 8.3 FL (ref 6–12)
POTASSIUM SERPL-SCNC: 3.9 MMOL/L (ref 3.5–5.2)
PROT SERPL-MCNC: 8.7 G/DL (ref 6–8.5)
PROTHROMBIN TIME: 13.1 SECONDS (ref 11.7–14.2)
RBC # BLD AUTO: 5.25 10*6/MM3 (ref 4.14–5.8)
SODIUM SERPL-SCNC: 140 MMOL/L (ref 136–145)
WBC NRBC COR # BLD AUTO: 12.66 10*3/MM3 (ref 3.4–10.8)

## 2024-12-10 PROCEDURE — 93580 TRANSCATH CLOSURE OF ASD: CPT | Performed by: INTERNAL MEDICINE

## 2024-12-10 PROCEDURE — G0378 HOSPITAL OBSERVATION PER HR: HCPCS

## 2024-12-10 PROCEDURE — 85610 PROTHROMBIN TIME: CPT | Performed by: INTERNAL MEDICINE

## 2024-12-10 PROCEDURE — 99152 MOD SED SAME PHYS/QHP 5/>YRS: CPT | Performed by: INTERNAL MEDICINE

## 2024-12-10 PROCEDURE — 25010000002 LIDOCAINE-EPINEPHRINE 2 %-1:100000 SOLUTION: Performed by: INTERNAL MEDICINE

## 2024-12-10 PROCEDURE — 85025 COMPLETE CBC W/AUTO DIFF WBC: CPT | Performed by: INTERNAL MEDICINE

## 2024-12-10 PROCEDURE — C1894 INTRO/SHEATH, NON-LASER: HCPCS | Performed by: INTERNAL MEDICINE

## 2024-12-10 PROCEDURE — 99153 MOD SED SAME PHYS/QHP EA: CPT | Performed by: INTERNAL MEDICINE

## 2024-12-10 PROCEDURE — C1769 GUIDE WIRE: HCPCS | Performed by: INTERNAL MEDICINE

## 2024-12-10 PROCEDURE — 80053 COMPREHEN METABOLIC PANEL: CPT | Performed by: INTERNAL MEDICINE

## 2024-12-10 PROCEDURE — 25010000002 LORAZEPAM PER 2 MG: Performed by: INTERNAL MEDICINE

## 2024-12-10 PROCEDURE — 71045 X-RAY EXAM CHEST 1 VIEW: CPT

## 2024-12-10 PROCEDURE — 93662 INTRACARDIAC ECG (ICE): CPT | Performed by: INTERNAL MEDICINE

## 2024-12-10 PROCEDURE — C1817 SEPTAL DEFECT IMP SYS: HCPCS | Performed by: INTERNAL MEDICINE

## 2024-12-10 PROCEDURE — 25010000002 FENTANYL CITRATE (PF) 100 MCG/2ML SOLUTION: Performed by: INTERNAL MEDICINE

## 2024-12-10 PROCEDURE — 25010000002 MIDAZOLAM PER 1 MG: Performed by: INTERNAL MEDICINE

## 2024-12-10 PROCEDURE — C1759 CATH, INTRA ECHOCARDIOGRAPHY: HCPCS | Performed by: INTERNAL MEDICINE

## 2024-12-10 PROCEDURE — 25010000002 HEPARIN (PORCINE) PER 1000 UNITS: Performed by: INTERNAL MEDICINE

## 2024-12-10 DEVICE — OCCL SEPTL CARDIOFORM 25MM: Type: IMPLANTABLE DEVICE | Site: HEART | Status: FUNCTIONAL

## 2024-12-10 RX ORDER — HEPARIN SODIUM 1000 [USP'U]/ML
INJECTION, SOLUTION INTRAVENOUS; SUBCUTANEOUS
Status: DISCONTINUED | OUTPATIENT
Start: 2024-12-10 | End: 2024-12-10 | Stop reason: HOSPADM

## 2024-12-10 RX ORDER — ASPIRIN 81 MG/1
81 TABLET ORAL DAILY
Status: CANCELLED | OUTPATIENT
Start: 2024-12-10

## 2024-12-10 RX ORDER — ACETAMINOPHEN 325 MG/1
650 TABLET ORAL EVERY 4 HOURS PRN
Status: DISCONTINUED | OUTPATIENT
Start: 2024-12-10 | End: 2024-12-11 | Stop reason: HOSPADM

## 2024-12-10 RX ORDER — HYDRALAZINE HYDROCHLORIDE 20 MG/ML
5 INJECTION INTRAMUSCULAR; INTRAVENOUS
Status: DISCONTINUED | OUTPATIENT
Start: 2024-12-10 | End: 2024-12-10

## 2024-12-10 RX ORDER — FENTANYL CITRATE 50 UG/ML
INJECTION, SOLUTION INTRAMUSCULAR; INTRAVENOUS
Status: DISCONTINUED | OUTPATIENT
Start: 2024-12-10 | End: 2024-12-10 | Stop reason: HOSPADM

## 2024-12-10 RX ORDER — HYDROCODONE BITARTRATE AND ACETAMINOPHEN 5; 325 MG/1; MG/1
1 TABLET ORAL EVERY 4 HOURS PRN
Status: DISCONTINUED | OUTPATIENT
Start: 2024-12-10 | End: 2024-12-11 | Stop reason: HOSPADM

## 2024-12-10 RX ORDER — LORAZEPAM 2 MG/ML
0.5 INJECTION INTRAMUSCULAR ONCE
Status: COMPLETED | OUTPATIENT
Start: 2024-12-10 | End: 2024-12-10

## 2024-12-10 RX ORDER — ONDANSETRON 2 MG/ML
4 INJECTION INTRAMUSCULAR; INTRAVENOUS ONCE AS NEEDED
Status: DISCONTINUED | OUTPATIENT
Start: 2024-12-10 | End: 2024-12-10

## 2024-12-10 RX ORDER — NALOXONE HCL 0.4 MG/ML
0.4 VIAL (ML) INJECTION
Status: DISCONTINUED | OUTPATIENT
Start: 2024-12-10 | End: 2024-12-11 | Stop reason: HOSPADM

## 2024-12-10 RX ORDER — MIDAZOLAM HYDROCHLORIDE 1 MG/ML
INJECTION, SOLUTION INTRAMUSCULAR; INTRAVENOUS
Status: DISCONTINUED | OUTPATIENT
Start: 2024-12-10 | End: 2024-12-10 | Stop reason: HOSPADM

## 2024-12-10 RX ORDER — NITROGLYCERIN 0.4 MG/1
0.4 TABLET SUBLINGUAL
Status: DISCONTINUED | OUTPATIENT
Start: 2024-12-10 | End: 2024-12-11 | Stop reason: HOSPADM

## 2024-12-10 RX ORDER — ATORVASTATIN CALCIUM 40 MG/1
80 TABLET, FILM COATED ORAL NIGHTLY
Status: DISCONTINUED | OUTPATIENT
Start: 2024-12-11 | End: 2024-12-11 | Stop reason: HOSPADM

## 2024-12-10 RX ORDER — LABETALOL HYDROCHLORIDE 5 MG/ML
5 INJECTION, SOLUTION INTRAVENOUS
Status: DISCONTINUED | OUTPATIENT
Start: 2024-12-10 | End: 2024-12-10

## 2024-12-10 RX ORDER — DIPHENHYDRAMINE HYDROCHLORIDE 50 MG/ML
12.5 INJECTION INTRAMUSCULAR; INTRAVENOUS
Status: DISCONTINUED | OUTPATIENT
Start: 2024-12-10 | End: 2024-12-10

## 2024-12-10 RX ORDER — ACETAMINOPHEN 650 MG/1
650 SUPPOSITORY RECTAL EVERY 4 HOURS PRN
Status: DISCONTINUED | OUTPATIENT
Start: 2024-12-10 | End: 2024-12-11 | Stop reason: HOSPADM

## 2024-12-10 RX ORDER — NALOXONE HCL 0.4 MG/ML
0.4 VIAL (ML) INJECTION AS NEEDED
Status: DISCONTINUED | OUTPATIENT
Start: 2024-12-10 | End: 2024-12-10

## 2024-12-10 RX ORDER — CLOPIDOGREL BISULFATE 75 MG/1
75 TABLET ORAL DAILY
Status: CANCELLED | OUTPATIENT
Start: 2024-12-10

## 2024-12-10 RX ORDER — ONDANSETRON 2 MG/ML
4 INJECTION INTRAMUSCULAR; INTRAVENOUS EVERY 6 HOURS PRN
Status: DISCONTINUED | OUTPATIENT
Start: 2024-12-10 | End: 2024-12-11 | Stop reason: HOSPADM

## 2024-12-10 RX ORDER — LISINOPRIL 5 MG/1
10 TABLET ORAL DAILY
Status: DISCONTINUED | OUTPATIENT
Start: 2024-12-11 | End: 2024-12-11 | Stop reason: HOSPADM

## 2024-12-10 RX ORDER — MORPHINE SULFATE 2 MG/ML
1 INJECTION, SOLUTION INTRAMUSCULAR; INTRAVENOUS EVERY 4 HOURS PRN
Status: DISCONTINUED | OUTPATIENT
Start: 2024-12-10 | End: 2024-12-11 | Stop reason: HOSPADM

## 2024-12-10 RX ORDER — ACETAMINOPHEN 325 MG/1
650 TABLET ORAL ONCE AS NEEDED
Status: DISCONTINUED | OUTPATIENT
Start: 2024-12-10 | End: 2024-12-10

## 2024-12-10 RX ORDER — ALBUTEROL SULFATE 0.83 MG/ML
2.5 SOLUTION RESPIRATORY (INHALATION) ONCE AS NEEDED
Status: DISCONTINUED | OUTPATIENT
Start: 2024-12-10 | End: 2024-12-10

## 2024-12-10 RX ORDER — LIDOCAINE HYDROCHLORIDE AND EPINEPHRINE BITARTRATE 20; .01 MG/ML; MG/ML
INJECTION, SOLUTION SUBCUTANEOUS
Status: DISCONTINUED | OUTPATIENT
Start: 2024-12-10 | End: 2024-12-10 | Stop reason: HOSPADM

## 2024-12-10 RX ORDER — ACETAMINOPHEN 650 MG/1
650 SUPPOSITORY RECTAL EVERY 4 HOURS PRN
Status: DISCONTINUED | OUTPATIENT
Start: 2024-12-10 | End: 2024-12-10

## 2024-12-10 RX ADMIN — ATORVASTATIN CALCIUM 80 MG: 40 TABLET, FILM COATED ORAL at 23:21

## 2024-12-10 RX ADMIN — LORAZEPAM 0.5 MG: 2 INJECTION INTRAMUSCULAR; INTRAVENOUS at 13:08

## 2024-12-10 RX ADMIN — APIXABAN 5 MG: 5 TABLET, FILM COATED ORAL at 23:21

## 2024-12-10 RX ADMIN — LORAZEPAM 0.5 MG: 2 INJECTION INTRAMUSCULAR; INTRAVENOUS at 16:32

## 2024-12-10 NOTE — H&P
Patient Care Team:  Simone Villalpando MD as PCP - General (Family Medicine)  Dario Gaona MD as Consulting Physician (Cardiology)    Chief complaint here for PFO closure    Subjective   Patient has cryptogenic stroke and PFO is here for closure.  History of Present Illness    Mr. Miguel Leung has PMH of     CVA  Hole in the heart possible PFO  Hypertension  History of drug abuse and homelessness  Current cigarette smoker     Sent here for evaluation of hole in the heart closure.  Patient is a poor historian.  Came from [facility] in [place] which reportedly is a shelter.  Patient does not know what medications he takes.  Patient denies any chest pain or shortness of breath.  Patient has been sober for 100 days now.Patient reportedly was at [facility] for stroke.  Patient reportedly had a Monitor which did not reveal A-fib an echo which showed a hole in the heart probably PFO was recommended to have the hole closed.  Patient underwent NATALIE which revealed PFO is here for closure.        Assessment:  :     CVA  Hypertension  Cigarette smoker  PFO        Recommendations / Plan:         Patient underwent NATALIE which revealed PFO is here for closure.  Risk benefits alternatives explained.  Unseld on smoking cessation.  Continue medical management with        Review of Systems   See HPI    Past Medical History:   Diagnosis Date    Hypertension     Meningitis spinal     Stroke      Past Surgical History:   Procedure Laterality Date    SPINE SURGERY       Family History   Family history unknown: Yes     Social History     Tobacco Use    Smoking status: Every Day     Current packs/day: 1.00     Types: Cigarettes     Passive exposure: Current    Smokeless tobacco: Never   Vaping Use    Vaping status: Never Used   Substance Use Topics    Alcohol use: Not Currently     Comment: quit 7/3/24    Drug use: Not Currently     E-cigarette/Vaping    E-cigarette/Vaping Use Never User   "    E-cigarette/Vaping Substances    Nicotine No     THC No     CBD No     Flavoring No      Medications Prior to Admission   Medication Sig Dispense Refill Last Dose/Taking    apixaban (ELIQUIS) 5 MG tablet tablet Take 1 tablet by mouth 2 (Two) Times a Day.   12/7/2024    atorvastatin (LIPITOR) 80 MG tablet Take 1 tablet by mouth Every Night.   12/9/2024    hydrOXYzine pamoate (VISTARIL) 50 MG capsule Take 1 capsule by mouth 3 (Three) Times a Day As Needed.   12/9/2024    ibuprofen (ADVIL,MOTRIN) 200 MG tablet Take 2 tablets by mouth Every 6 (Six) Hours As Needed for Mild Pain.   12/9/2024    lisinopril (PRINIVIL,ZESTRIL) 10 MG tablet Take 1 tablet by mouth Daily. 30 tablet 0 12/9/2024    Apixaban Starter Pack (Eliquis DVT/PE Starter Pack) tablet therapy pack Take two 5 mg tablets by mouth every 12 hours for 7 days. Followed by one 5 mg tablet every 12 hours. (Dispense starter pack if available) 74 tablet 0     nitroglycerin (NITROSTAT) 0.4 MG SL tablet Place 1 tablet under the tongue Every 5 (Five) Minutes As Needed for Chest Pain. Take no more than 3 doses in 15 minutes.   Unknown     Allergies:  Patient has no known allergies.    Objective      Vital Signs  Temp:  [98.7 °F (37.1 °C)] 98.7 °F (37.1 °C)  Heart Rate:  [71] 71  Resp:  [20] 20  BP: (117)/(87) 117/87    Physical Exam    Physical Exam   /87 (BP Location: Right arm, Patient Position: Lying)   Pulse 71   Temp 98.7 °F (37.1 °C) (Oral)   Resp 20   Ht 166.4 cm (65.5\")   Wt 79.1 kg (174 lb 6.1 oz)   SpO2 94%   BMI 28.58 kg/m²   General:  Appears in no acute distress  Eyes: Sclera is anicteric,  conjunctiva is clear   HEENT:  No JVD. Thyroid not visibly enlarged. No mucosal pallor or cyanosis  Respiratory: Respirations regular and unlabored at rest.  Clear to auscultation  Cardiovascular: S1,S2 Regular rate and rhythm. No murmur, rub or gallop auscultated.  . No pretibial pitting edema  Gastrointestinal: Abdomen nondistended.  Musculoskeletal:  " No abnormal movements  Extremities: No digital clubbing or cyanosis  Skin: Color pink.   Neuro: Alert and awake.     Results Review:    I reviewed the patient's new clinical results.      Assessment & Plan       PFO (patent foramen ovale)    Cryptogenic stroke      Assessment & Plan    I discussed the patients findings and my recommendations with patient and family    Dario Gaona MD  12/10/24  13:50 EST

## 2024-12-10 NOTE — DISCHARGE INSTRUCTIONS
Post Cath/PFO Instructions  Drink plenty of water for the next 24 hours. This helps to eliminate the dye used in your procedure through urination.  You may resume a normal diet; however, try to avoid foods that would cause gas or constipation.    What to do for pain: acetaminophen (Tylenol), not ibuprofen (Advil) can be used for puncture site tenderness. If your pain is unmanageable with this method, call the Cardiologist's office.     Sedative medication (Anesthesia) given to you during your catheterization may decrease your judgement and reaction time for up to 24-48 hours.  Therefore:  DO NOT drive, operate hazardous machinery, or consume alcoholic beverages for 24 hours.   DO NOT make any important/legal decisions for 24 hours.   Have someone stay with you for at least 24 hours.    For the next 48 hours (to allow proper healing and prevent bleeding):  Avoid excessive bending at wound site  Avoid straining (anything that would tense up muscles around the affected puncture site)  Avoid lifting, pushing, or pulling objects greater than 5 pounds, for 5 days  For Arm Cases:  No flexing at the puncture site, such as hammering, golfing, bowling, or swinging any objects  For Groin Cases:  Refrain from running, vigorous walking, and sexual activity  No prolonged sitting or standing   Limit stair climbing as much as possible    Keep the puncture site clean and dry.  After 24 hours, remove the dressing and replace it with a Band-Aid for at least one additional day.  Gently clean the site with mild soap and water.  No scrubbing/rubbing, and lightly pat the area dry.  Showers are acceptable; however, avoid submerging in water (tub baths, hot tubs, pools, dishwater, etc…) for at least one week.  The site should be completely healed before resuming these activities to reduce the risk of infection. Watch for signs and symptoms of infection and notify the physician of any of the following:  Bleeding or an increase in swelling,  redness, or warmth at the puncture site  Fever  Increased soreness around puncture site  Foul odor or significant drainage from the puncture site  **A bruise or small “pea sized” lump under the skin at the puncture site is not unusual.  This should disappear within 3-4 weeks.**    CONTACT YOUR PHYSICIAN OR CALL 911 IF YOU EXPERIENCE ANY OF THE FOLLOWING:  Increased angina (chest pain) or frequent sensations of pressure, burning, pain, or other discomfort in the chest, arm, jaws, or stomach  Lightheadedness, dizziness, faint feeling, sweating, or difficulty breathing  Odd changes in sedation (like numbness, tingling, coldness, or pain) or color (pale/bluish) in the arm or leg in which the catheter was inserted.    IMPORTANT:  Although this occurs very rarely, if you should develop bright red or excessive bleeding, feel a “pop” inside at the insertion site, or notice a sudden increase in swelling larger than a walnut, you should call 911.  Hold continuous firm pressure to the access site until emergency personnel arrive.  It is best if someone else can do this for you.

## 2024-12-10 NOTE — Clinical Note
A 11 fr sheath was successfully inserted using micropuncture technique into the right femoral vein. Never

## 2024-12-11 ENCOUNTER — READMISSION MANAGEMENT (OUTPATIENT)
Dept: CALL CENTER | Facility: HOSPITAL | Age: 59
End: 2024-12-11
Payer: MEDICAID

## 2024-12-11 VITALS
HEART RATE: 69 BPM | WEIGHT: 174.38 LBS | HEIGHT: 66 IN | TEMPERATURE: 98.2 F | SYSTOLIC BLOOD PRESSURE: 123 MMHG | BODY MASS INDEX: 28.03 KG/M2 | RESPIRATION RATE: 30 BRPM | DIASTOLIC BLOOD PRESSURE: 101 MMHG | OXYGEN SATURATION: 95 %

## 2024-12-11 LAB
QT INTERVAL: 397 MS
QTC INTERVAL: 424 MS

## 2024-12-11 PROCEDURE — G0378 HOSPITAL OBSERVATION PER HR: HCPCS

## 2024-12-11 PROCEDURE — 99238 HOSP IP/OBS DSCHRG MGMT 30/<: CPT | Performed by: NURSE PRACTITIONER

## 2024-12-11 PROCEDURE — 93010 ELECTROCARDIOGRAM REPORT: CPT | Performed by: STUDENT IN AN ORGANIZED HEALTH CARE EDUCATION/TRAINING PROGRAM

## 2024-12-11 PROCEDURE — G0008 ADMIN INFLUENZA VIRUS VAC: HCPCS | Performed by: INTERNAL MEDICINE

## 2024-12-11 PROCEDURE — 90656 IIV3 VACC NO PRSV 0.5 ML IM: CPT | Performed by: INTERNAL MEDICINE

## 2024-12-11 PROCEDURE — 93005 ELECTROCARDIOGRAM TRACING: CPT | Performed by: INTERNAL MEDICINE

## 2024-12-11 PROCEDURE — 25010000002 INFLUENZA VIRUS VACC SPLIT PF 0.5 ML SUSPENSION PREFILLED SYRINGE: Performed by: INTERNAL MEDICINE

## 2024-12-11 RX ORDER — CLOPIDOGREL BISULFATE 75 MG/1
75 TABLET ORAL DAILY
Qty: 90 TABLET | Refills: 3 | Status: SHIPPED | OUTPATIENT
Start: 2024-12-11

## 2024-12-11 RX ORDER — CLOPIDOGREL BISULFATE 75 MG/1
75 TABLET ORAL ONCE
Status: COMPLETED | OUTPATIENT
Start: 2024-12-11 | End: 2024-12-11

## 2024-12-11 RX ORDER — CLOPIDOGREL BISULFATE 75 MG/1
75 TABLET ORAL DAILY
Qty: 90 TABLET | Refills: 3 | Status: SHIPPED | OUTPATIENT
Start: 2024-12-11 | End: 2024-12-11

## 2024-12-11 RX ADMIN — CLOPIDOGREL BISULFATE 75 MG: 75 TABLET ORAL at 11:09

## 2024-12-11 RX ADMIN — APIXABAN 5 MG: 5 TABLET, FILM COATED ORAL at 10:14

## 2024-12-11 RX ADMIN — LISINOPRIL 10 MG: 5 TABLET ORAL at 10:14

## 2024-12-11 RX ADMIN — INFLUENZA A VIRUS A/VICTORIA/4897/2022 IVR-238 (H1N1) ANTIGEN (FORMALDEHYDE INACTIVATED), INFLUENZA A VIRUS A/CALIFORNIA/122/2022 SAN-022 (H3N2) ANTIGEN (FORMALDEHYDE INACTIVATED), AND INFLUENZA B VIRUS B/MICHIGAN/01/2021 ANTIGEN (FORMALDEHYDE INACTIVATED) 0.5 ML: 15; 15; 15 INJECTION, SUSPENSION INTRAMUSCULAR at 10:14

## 2024-12-11 NOTE — OUTREACH NOTE
Prep Survey      Flowsheet Row Responses   [facility] facility patient discharged from? [facility]   Is LACE score < 7 ? Yes   Eligibility [facility]   Date of Admission 12/10/24   Date of Discharge 12/11/24   Discharge Disposition Home or Self Care   Discharge diagnosis Cryptogenic stroke   Does the patient have one of the following disease processes/diagnoses(primary or secondary)? Stroke   Does the patient have Home health ordered? No   Is there a DME ordered? No   Prep survey completed? Yes            CASSIUS STYLES - Registered Nurse

## 2024-12-11 NOTE — CASE MANAGEMENT/SOCIAL WORK
Social Work Assessment  [facility]     Patient Name: Miguel Leung  MRN: [id number]  Today's Date: 12/11/2024    Admit Date: 12/10/2024       Discharge Plan       Row Name 12/11/24 4696       Plan    Plan Comments LSW met with patient and guests (Verena Elida, Faustino) that he stated were “like family” at bedside, introduced self/role, and reason for visit. LSW asked patient about his inquiry for housing resources and patient’s guests stated that he is currently at [facility], but needs more information. LSW discussed [employer], the [facility], and [employer]. Patient’s guests stated that they run a community organization and are sister organizations with [employer] so they are familiar with the services they offer. LSW discussed [place] resource packet with patient and offered information be provided. Patient agreeable and LSW provided information at bedside. Patient’s guests stated that patient doesn’t have a  or  through his stay at [facility] and that he is also needing information on applying for disability and Power of  (POA) information for Verena to become POA. LSW discussed information on description of disability benefits, instructions on how to apply for disability, process to obtain POA and Healthcare Representative (HCR). LSW offered information discussed, patient agreeable and LSW provided information at bedside. Patient received discharge orders and has no further needs at this time.        DARY Awan, LSW    Phone: [phone]  Fax: [phone]  [email or web address]

## 2024-12-11 NOTE — CASE MANAGEMENT/SOCIAL WORK
Case Management/Social Work    Patient Name:  Miguel Leung  YOB: 1965  MRN: [id number]  Admit Date:  12/10/2024        Social Drivers of Health     Tobacco Use: High Risk (12/10/2024)    Patient History     Smoking Tobacco Use: Every Day     Smokeless Tobacco Use: Never     Passive Exposure: Current   Alcohol Use: Not At Risk (12/11/2024)    AUDIT-C     Frequency of Alcohol Consumption: Never     Average Number of Drinks: Patient does not drink     Frequency of Binge Drinking: Never   Financial Resource Strain: Low Risk  (12/11/2024)    Overall Financial Resource Strain (CARDIA)     Difficulty of Paying Living Expenses: Not hard at all   Food Insecurity: No Food Insecurity (12/11/2024)    Hunger Vital Sign     Worried About Running Out of Food in the Last Year: Never true     Ran Out of Food in the Last Year: Never true   Transportation Needs: No Transportation Needs (12/11/2024)    PRAPARE - Transportation     Lack of Transportation (Medical): No     Lack of Transportation (Non-Medical): No   Physical Activity: Insufficiently Active (12/11/2024)    Exercise Vital Sign     Days of Exercise per Week: 1 day     Minutes of Exercise per Session: 30 min   Stress: Stress Concern Present (12/11/2024)    [employer] [place] [employer] - Occupational Stress Questionnaire     Feeling of Stress : Very much   Social Connections: Not At Risk (12/11/2024)    Family and Community Support     Help with Day-to-Day Activities: I get all the help I need     Lonely or Isolated: Never   Interpersonal Safety: Not At Risk (12/11/2024)    Abuse Screen     Unsafe at Home or Work/School: no     Feels Threatened by Someone?: no     Does Anyone Keep You from Contacting Others or Doint Things Outside the Home?: no     Physical Sign of Abuse Present: no   Depression: Not at risk (12/11/2024)    PHQ-2     PHQ-2 Score: 2   Housing Stability: Not At Risk (12/11/2024)    Housing Stability     Current Living Arrangements:  shelter     Potentially Unsafe Housing Conditions: none   Utilities: Patient Unable To Answer (12/11/2024)    [facility] Utilities     Threatened with loss of utilities: Patient unable to answer   Health Literacy: Not At Risk (12/11/2024)    Education     Help with school or training?: No     Preferred Language: English   Employment: Not At Risk (12/11/2024)    Employment     Do you want help finding or keeping work or a job?: I do not need or want help   Disabilities: Not At Risk (12/11/2024)    Disabilities     Concentrating, Remembering, or Making Decisions Difficulty: no     Doing Errands Independently Difficulty: no           Electronically signed by:  Michelle Shirley RN  12/11/24 10:34 EST    Office Phone: [phone]  Office Cell:     [phone]

## 2024-12-11 NOTE — CASE MANAGEMENT/SOCIAL WORK
Discharge Planning Assessment   [facility]     Patient Name: Miguel Leung  MRN: [id number]  Today's Date: 12/11/2024    Admit Date: 12/10/2024    Plan: [place] Plan: Return to Shelter. Friend to provide transport.   Discharge Needs Assessment       Row Name 12/11/24 1044       Living Environment    People in Home alone    Current Living Arrangements shelter    Potentially Unsafe Housing Conditions none    In the past 12 months has the electric, gas, oil, or water company threatened to shut off services in your home? Pt Unable    Primary Care Provided by self    Provides Primary Care For no one    Family Caregiver if Needed child(sonia), adult    Family Caregiver Names Homeless Outreach Friend Verena Metzard    Quality of Family Relationships helpful;involved    Able to Return to Prior Arrangements yes       Resource/Environmental Concerns    Resource/Environmental Concerns financial    Financial Concerns rent or mortgage, unable to afford;unemployed    Transportation Concerns none       Transition Planning    Patient/Family Anticipates Transition to other (see comments)  Shelter    Patient/Family Anticipated Services at Transition ;community agency    Transportation Anticipated family or friend will provide       Discharge Needs Assessment    Readmission Within the Last 30 Days no previous admission in last 30 days    Concerns to be Addressed no discharge needs identified;denies needs/concerns at this time    Do you want help finding or keeping work or a job? I do not need or want help    Do you want help with school or training? For example, starting or completing job training or getting a high school diploma, GED or equivalent No    Anticipated Changes Related to Illness none    Equipment Needed After Discharge none    Provided Post Acute Provider List? N/A    Provided Post Acute Provider Quality & Resource List? N/A    Current Discharge Risk homeless                   Discharge Plan       Row Name 12/11/24  "1057       Plan    Plan DC Plan: Return to Shelter. Friend to provide transport.    Patient/Family in Agreement with Plan yes    Provided Post Acute Provider List? N/A    Provided Post Acute Provider Quality & Resource List? N/A    Plan Comments CM spoke with patient at bedside to discuss admission assessment and discharge planning. Patient confirms PCP and pharmacy. Patient confirms he is agreeable to meds to bed program at this time. CM updated pharmacy in [facility]. Patient denies any difficulty affording medications or food at this time. Patient denies any additional needs for services or DME at this time. Patient reports independent with ADL's but does not drive. SDOH completed. Patient plans to return to shelter. Patient friend will provide transportation when ready for DC. DC orders in place. No barriers.      Row Name 12/11/24 1051       Plan    Plan Comments SW verified that Verena Avilez is not Pt's biological daughter and updated emergency contact to reflect as \"friend\".               Expected Discharge Date and Time       Expected Discharge Date Expected Discharge Time    Dec 11, 2024            Demographic Summary       Row Name 12/11/24 1042       General Information    Admission Type inpatient    Arrived From emergency department;other (see comments)  Shelter    Referral Source admission list    Reason for Consult discharge planning    Preferred Language English       Contact Information    Permission Granted to Share Info With                    Functional Status       Row Name 12/11/24 1042       Functional Status    Usual Activity Tolerance good    Current Activity Tolerance good       Physical Activity    On average, how many days per week do you engage in moderate to strenuous exercise (like a brisk walk)? 1 day    On average, how many minutes do you engage in exercise at this level? 30 min    Number of minutes of exercise per week 30       Functional Status, IADL    Medications " independent    Meal Preparation independent    Housekeeping independent    Laundry independent    Shopping independent       Mental Status    General Appearance WDL WDL       Mental Status Summary    Recent Changes in Mental Status/Cognitive Functioning no changes       Employment/    Employment Status unemployed;disabled    Current or Previous Occupation not applicable               Met with patient in room      Maintain distance greater than six feet and spent less than fifteen minutes in the room.      Michelle Shirley, RN     Office Phone: [phone]  Office Cell:     [phone]

## 2024-12-11 NOTE — CASE MANAGEMENT/SOCIAL WORK
"Social Work Assessment  [facility]     Patient Name: Miguel Leung  MRN: [id number]  Today's Date: 12/11/2024    Admit Date: 12/10/2024     Discharge Plan       Row Name 12/11/24 1051       Plan    Plan Comments SW verified that Verena Avilez is not Pt's biological daughter and updated emergency contact to reflect as \"friend\".           Oly Bell, [facility]  Medical Social Worker    "

## 2024-12-11 NOTE — CASE MANAGEMENT/SOCIAL WORK
Case Management Discharge Note           Transportation Services  Private: Car (with friend)    Final Discharge Disposition Code: 01 - home or self-care

## 2024-12-11 NOTE — DISCHARGE SUMMARY
"  Date of Discharge:  12/11/2024    Discharge Diagnosis:   Active Hospital Problems    Diagnosis  POA    **Cryptogenic stroke [I63.9]  Unknown    PFO (patent foramen ovale) [Q21.12]  Not Applicable      Resolved Hospital Problems   No resolved problems to display.       Presenting Problem/History of Present Illness  Cryptogenic stroke [I63.9]  PFO (patent foramen ovale) [Q21.12]      Hospital Course  Patient is a 59 y.o. male presented for PFO closure.  Patient has a history of crypotgenic stroke and left lower extremity DVT.  Patient was observed overnight without any complications.  Discharge instructions were discussed with patient.  Patient to start plavix today and continue Eliquis (for DVT).  Will send patient to hematology as outpatient for evaluation of DVT.           Procedures Performed  Procedure(s):  Patent foramen ovale closure, emailed PFO rep -ML       Consults:   Consults       No orders found for last 30 day(s).            Pertinent Test Results: labs: reviewed and unremarkable     Ejection Fraction  No results found for: \"EF\"    Echo EF Estimated  No results found for: \"ECHOEFEST\"    Nuclear Stress Ejection Fraction  No components found for: \"NUCEF\"    Cath Ejection Fraction Quantitative  No results found for: \"CATHEF\"    Condition on Discharge: Stable    Physical Exam at Discharge :    Vital Signs  Temp:  [97.7 °F (36.5 °C)-98.7 °F (37.1 °C)] 98.2 °F (36.8 °C)  Heart Rate:  [56-81] 68  Resp:  [16-30] 30  BP: (102-149)/(65-94) 122/78  General:  Appears in no acute distress  Eyes: Sclera is anicteric,  conjunctiva is clear   HEENT:  No JVD. Thyroid not visibly enlarged. No mucosal pallor or cyanosis  Respiratory: Respirations regular and unlabored at rest.  CTA  Cardiovascular: S1,S2 Regular rate and rhythm. No murmur, rub or gallop auscultated.  . No pretibial pitting edema  Gastrointestinal: Abdomen nondistended, soft  Musculoskeletal:  No abnormal movements  Extremities: No digital clubbing or " cyanosis  right groin with no bruising or hematoma noted. Dressing intact.   Skin: Color pink. Skin warm and dry to touch. No rashes  No xanthoma  Neuro: Alert and awake, no lateralizing deficits appreciated        Discharge Disposition Home  Home or Self Care    Discharge Medications     Discharge Medications        New Medications        Instructions Start Date   clopidogrel 75 MG tablet  Commonly known as: PLAVIX   75 mg, Oral, Daily             Changes to Medications        Instructions Start Date   Eliquis 5 MG tablet tablet  Generic drug: apixaban  What changed: Another medication with the same name was removed. Continue taking this medication, and follow the directions you see here.   5 mg, Oral, 2 Times Daily             Continue These Medications        Instructions Start Date   atorvastatin 80 MG tablet  Commonly known as: LIPITOR   Take 1 tablet by mouth Every Night.      hydrOXYzine pamoate 50 MG capsule  Commonly known as: VISTARIL   Take 1 capsule by mouth 3 (Three) Times a Day As Needed.      lisinopril 10 MG tablet  Commonly known as: PRINIVIL,ZESTRIL   10 mg, Oral, Daily      nitroglycerin 0.4 MG SL tablet  Commonly known as: NITROSTAT   0.4 mg, Every 5 Minutes PRN             Stop These Medications      ibuprofen 200 MG tablet  Commonly known as: ADVIL,MOTRIN              Discharge Diet: Cardiac diet    Activity at Discharge: Activity as tolerated    Follow-up Appointments: Follow-up with me in 1 month  Future Appointments   Date Time Provider Department Center   1/3/2025 11:30 AM Jessi Hui APRN [facility]              DEANN London  12/11/24  12:06 EST    Time: Discharge Time Spent:30    Electronically signed by DEANN London, 12/11/24, 12:06 PM EST.

## 2024-12-12 ENCOUNTER — TRANSITIONAL CARE MANAGEMENT TELEPHONE ENCOUNTER (OUTPATIENT)
Dept: CALL CENTER | Facility: HOSPITAL | Age: 59
End: 2024-12-12
Payer: MEDICAID

## 2024-12-12 NOTE — OUTREACH NOTE
Call Center TCM Note      Flowsheet Row Responses   [facility] patient discharged from? [facility]   Does the patient have one of the following disease processes/diagnoses(primary or secondary)? Stroke   TCM attempt successful? Yes   Call start time 1513   Call end time 1517   Discharge diagnosis Cryptogenic stroke   Person spoke with today (if not patient) and relationship CHAPIN Albarado   Meds reviewed with patient/caregiver? N/A   Does the patient have all medications ordered at discharge? N/A   Is the patient taking all medications as directed (includes completed medication regime)? N/A   Does the patient have an appointment with their PCP within 7-14 days of discharge? No   Nursing Interventions PCP office requested to make appointment - message sent, Routed TCM call to PCP office   Psychosocial issues? No   Does the patient require any assistance with activities of daily living such as eating, bathing, dressing, walking, etc.? No   Does the patient have any residual symptoms from stroke/TIA? No   Does the patient understand the diet ordered at discharge? Yes   What is the patient's perception of their health status since discharge? Improving   Nursing interventions Nurse provided patient education   Is the patient/caregiver able to teach back the risk factors for a stroke? History of TIAs   Is the patient/caregiver able to teach back signs and symptoms related to disease process for when to call PCP? Yes   Is the patient/caregiver able to teach back signs and symptoms related to disease process for when to call 911? Yes   TCM call completed? Yes   Wrap up additional comments Pt's caregiver, Verena Siddiqi answered phone and is not listed on verbal but offered some information without being asked. Pt was not there at time of call, and pt does not have a phone. Advised for pt to make fu appt with PCP   Call end time 1517   Would this patient benefit from a Referral to [facility] Social Work? No   Is the patient  interested in additional calls from an ambulatory ? No            Mayra Pat RN    12/12/2024, 15:19 EST

## 2024-12-19 ENCOUNTER — READMISSION MANAGEMENT (OUTPATIENT)
Dept: CALL CENTER | Facility: HOSPITAL | Age: 59
End: 2024-12-19
Payer: MEDICAID

## 2024-12-19 NOTE — OUTREACH NOTE
Stroke Week 2 Survey      Flowsheet Row Responses   [facility] facility patient discharged from? [facility]   Does the patient have one of the following disease processes/diagnoses(primary or secondary)? Stroke   Week 2 attempt successful? Yes   Call start time 1149   Call end time 1150   Discharge diagnosis Cryptogenic stroke   Week 2 call completed? Yes   Revoked No further contact(revokes)-requires comment   Graduated/Revoked comments Verena reports Pt is doing well. Pt is at a homeless shelter. No other info available.   Call end time 1150            JASMINE DAVALOS - Registered Nurse

## 2024-12-31 NOTE — PROGRESS NOTES
Subjective:     Encounter Date:01/03/2025      Patient ID: Miguel Leung is a 59 y.o. male.    Chief Complaint: hospital follow up- PFO closure     History of Present Illness    Mr. Miguel Leung has PMH of     CVA, cryptogenic  Hole in the heart possible PFO  Hypertension  History of drug abuse and homelessness  Current cigarette smoker  Sub-acute DVT      Here for hospital follow-up.  Patient reportedly had a stroke and was found to have PFO.  He had Holter monitor which did not reveal any A-fib therefore he was sent here for an evaluation of PFO closure.  Prior to PFO closure he underwent venous Doppler that showed subacute DVT in the left gastrocnemius patient was started on Eliquis.    Today patient is here with his healthcare representative he lives at the [facility] in [place].  Patient denies any chest pain shortness of breath or lower extremity edema.  He reports that he is still smoking he denies any issues with his groin.    Blood pressure in office today 136/79 heart rate 76 oxygen 98% on room air weight 162 pounds           Lab Review:     Labs from 12/10/2024 CMP unremarkable CBC with white count of 12.66 EKG showed sinus rhythm on 12/11/2024      The following portions of the patient's history were reviewed and updated as appropriate: allergies, current medications, past family history, past medical history, past social history, past surgical history, and problem list.      Review of Systems   Constitutional: Negative for malaise/fatigue.   Cardiovascular:  Negative for chest pain, dyspnea on exertion, leg swelling and palpitations.   Respiratory:  Negative for cough and shortness of breath.    Gastrointestinal:  Negative for abdominal pain, nausea and vomiting.   Neurological:  Negative for dizziness, focal weakness, headaches, light-headedness and numbness.   All other systems reviewed and are negative.    Past Medical History:   Diagnosis Date    Hypertension     Meningitis spinal   "   Stroke      Past Surgical History:   Procedure Laterality Date    PATENT FORAMEN OVALE CLOSURE  12/10/2024    PATENT FORAMEN OVALE CLOSURE N/A 12/10/2024    Procedure: Patent foramen ovale closure, emailed PFO rep -ML;  Surgeon: Erick Navarrete MD;  Location: [facility] INVASIVE LOCATION;  Service: Cardiovascular;  Laterality: N/A;  with ICE    SPINE SURGERY       /79 (BP Location: Right arm, Patient Position: Sitting, Cuff Size: Adult)   Pulse 76   Ht 165.1 cm (65\")   Wt 73.5 kg (162 lb)   SpO2 98%   BMI 26.96 kg/m²   Family History   Family history unknown: Yes       Current Outpatient Medications:     apixaban (ELIQUIS) 5 MG tablet tablet, Take 1 tablet by mouth 2 (Two) Times a Day., Disp: 60 tablet, Rfl: 11    atorvastatin (LIPITOR) 80 MG tablet, Take 1 tablet by mouth Every Night., Disp: , Rfl:     clopidogrel (PLAVIX) 75 MG tablet, Take 1 tablet by mouth Daily., Disp: 90 tablet, Rfl: 3    hydrOXYzine pamoate (VISTARIL) 50 MG capsule, Take 1 capsule by mouth 3 (Three) Times a Day As Needed., Disp: , Rfl:     lisinopril (PRINIVIL,ZESTRIL) 10 MG tablet, Take 1 tablet by mouth Daily., Disp: 30 tablet, Rfl: 0    nitroglycerin (NITROSTAT) 0.4 MG SL tablet, Place 1 tablet under the tongue Every 5 (Five) Minutes As Needed for Chest Pain. Take no more than 3 doses in 15 minutes., Disp: , Rfl:     Sofosbuvir-Velpatasvir 400-100 MG tablet, Take 1 tablet by mouth Daily., Disp: , Rfl:     traZODone (DESYREL) 50 MG tablet, Take 1 tablet by mouth every night at bedtime., Disp: , Rfl:     Ventolin  (90 Base) MCG/ACT inhaler, Inhale 1 puff 4 (Four) Times a Day., Disp: , Rfl:     No Known Allergies    Social History     Socioeconomic History    Marital status:    Tobacco Use    Smoking status: Every Day     Current packs/day: 1.00     Types: Cigarettes     Passive exposure: Current    Smokeless tobacco: Never   Vaping Use    Vaping status: Former    Quit date: 8/1/2024    Substances: Nicotine " "   Passive vaping exposure: Yes   Substance and Sexual Activity    Alcohol use: Not Currently     Comment: quit 7/3/24    Drug use: Not Currently     Types: \"Crack\" cocaine, Cocaine(coke), Heroin, Methamphetamines, Amphetamines, Marijuana, LSD    Sexual activity: Defer                Objective:     Vitals reviewed.   Constitutional:       Appearance: Not in distress. Frail.   Neck:      Vascular: No JVR. JVD normal.   Pulmonary:      Effort: Pulmonary effort is normal.      Breath sounds: Normal breath sounds. No wheezing. No rhonchi. No rales.   Chest:      Chest wall: Not tender to palpatation.   Cardiovascular:      PMI at left midclavicular line. Normal rate. Regular rhythm. Normal S1. Normal S2.       Murmurs: There is no murmur.      No gallop.  No click. No rub.   Pulses:     Intact distal pulses.   Edema:     Peripheral edema absent.   Abdominal:      General: Bowel sounds are normal.      Palpations: Abdomen is soft.      Tenderness: There is no abdominal tenderness.   Musculoskeletal: Normal range of motion.         General: No tenderness. Skin:     General: Skin is warm and dry.   Neurological:      General: No focal deficit present.      Mental Status: Alert and oriented to person, place and time.       Procedures                  Assessment:     MDM       Diagnosis Plan   1. Hospital discharge follow-up        2. PFO (patent foramen ovale)        3. Cryptogenic stroke        4. Essential hypertension        5. Sub-Acute deep vein thrombosis (DVT) of calf muscle vein of left lower extremity                       Plan:   Chart reviewed  Labs reviewed  Patient recently underwent PFO closure due to recent cryptogenic stroke  He reports he has been doing well taking his medications    Currently living at a homeless shelter however they do help him with his medications    Continue Eliquis for unprovoked DVT  Continue Plavix, statin lisinopril as tolerated    Advised to stay sober from drug use advised to quit " smoking he is not interested in anything to help him quit at this time    Follow-up in 3 months with Dr. Gaona    Caregiver asking what medications patient should take for cold season if he should get a cold discussed that he should not be taking any kind of decongestants over-the-counter.  Discussed that he can take Coricidin HBP or plain Robitussin    Electronically signed by DEANN London, 01/03/25, 12:24 PM EST.          This document is intended for medical expert use only.  Reading of this document by patients and/or patient's family without participating medical staff guidance may result in misinterpretation and unintended morbidity. Any interpretation of such data is the responsibility of the patient and/or family member responsible for the patient in concert with their primary or specialist providers, not to be left for sources of online search as such as [employer], [employer] or similar queries.  Relying on these approaches to knowledge may result in misinterpretation, misguided goals of care and even death should patient or family members try recommendations outside of the realm of professional medical care in a supervised inpatient environment.

## 2025-01-03 ENCOUNTER — OFFICE VISIT (OUTPATIENT)
Dept: CARDIOLOGY | Facility: CLINIC | Age: 60
End: 2025-01-03

## 2025-01-03 VITALS
WEIGHT: 162 LBS | HEART RATE: 76 BPM | DIASTOLIC BLOOD PRESSURE: 79 MMHG | BODY MASS INDEX: 26.99 KG/M2 | OXYGEN SATURATION: 98 % | SYSTOLIC BLOOD PRESSURE: 136 MMHG | HEIGHT: 65 IN

## 2025-01-03 DIAGNOSIS — Q21.12 PFO (PATENT FORAMEN OVALE): Chronic | ICD-10-CM

## 2025-01-03 DIAGNOSIS — Z09 HOSPITAL DISCHARGE FOLLOW-UP: Primary | ICD-10-CM

## 2025-01-03 DIAGNOSIS — I10 ESSENTIAL HYPERTENSION: ICD-10-CM

## 2025-01-03 DIAGNOSIS — I63.9 CRYPTOGENIC STROKE: Chronic | ICD-10-CM

## 2025-01-03 DIAGNOSIS — I82.462 ACUTE DEEP VEIN THROMBOSIS (DVT) OF CALF MUSCLE VEIN OF LEFT LOWER EXTREMITY: ICD-10-CM

## 2025-01-03 RX ORDER — TRAZODONE HYDROCHLORIDE 50 MG/1
50 TABLET, FILM COATED ORAL
COMMUNITY
Start: 2024-12-18

## 2025-01-03 RX ORDER — ALBUTEROL SULFATE 90 UG/1
1 AEROSOL, METERED RESPIRATORY (INHALATION)
COMMUNITY
Start: 2025-01-02

## 2025-01-03 RX ORDER — VELPATASVIR AND SOFOSBUVIR 100; 400 MG/1; MG/1
1 TABLET, FILM COATED ORAL DAILY
COMMUNITY
Start: 2024-12-19

## 2025-01-03 NOTE — PATIENT INSTRUCTIONS
Steps to Quit Smoking  Smoking tobacco is the leading cause of preventable death. It can affect almost every organ in the body. Smoking puts you and people around you at risk for many serious, long-lasting (chronic) diseases. Quitting smoking can be hard, but it is one of the best things that you can do for your health. It is never too late to quit.  Do not give up if you cannot quit the first time. Some people need to try many times to quit. Do your best to stick to your quit plan, and talk with your doctor if you have any questions or concerns.  How do I get ready to quit?  Pick a date to quit. Set a date within the next 2 weeks to give you time to prepare.  Write down the reasons why you are quitting. Keep this list in places where you will see it often.  Tell your family, friends, and co-workers that you are quitting. Their support is important.  Talk with your doctor about the choices that may help you quit.  Find out if your health insurance will pay for these treatments.  Know the people, places, things, and activities that make you want to smoke (triggers). Avoid them.  What first steps can I take to quit smoking?  Throw away all cigarettes at home, at work, and in your car.  Throw away the things that you use when you smoke, such as ashtrays and lighters.  Clean your car. Empty the ashtray.  Clean your home, including curtains and carpets.  What can I do to help me quit smoking?  Talk with your doctor about taking medicines and seeing a counselor. You are more likely to succeed when you do both.  If you are pregnant or breastfeeding:  Talk with your doctor about counseling or other ways to quit smoking.  Do not take medicine to help you quit smoking unless your doctor tells you to.  Quit right away  Quit smoking completely, instead of slowly cutting back on how much you smoke over a period of time. Stopping smoking right away may be more successful than slowly quitting.  Go to counseling. In-person is best  if this is an option. You are more likely to quit if you go to counseling sessions regularly.  Take medicine  You may take medicines to help you quit. Some medicines need a prescription, and some you can buy over-the-counter. Some medicines may contain a drug called nicotine to replace the nicotine in cigarettes. Medicines may:  Help you stop having the desire to smoke (cravings).  Help to stop the problems that come when you stop smoking (withdrawal symptoms).  Your doctor may ask you to use:  Nicotine patches, gum, or lozenges.  Nicotine inhalers or sprays.  Non-nicotine medicine that you take by mouth.  Find resources  Find resources and other ways to help you quit smoking and remain smoke-free after you quit. They include:  Online chats with a counselor.  Phone quitlines.  Printed self-help materials.  Support groups or group counseling.  Text messaging programs.  Mobile phone apps. Use apps on your mobile phone or tablet that can help you stick to your quit plan. Examples of free services include Quit Guide from the CDC and smokefree.gov    What can I do to make it easier to quit?    Talk to your family and friends. Ask them to support and encourage you.  Call a phone quitline, such as 8-800-QUIT-NOW, reach out to support groups, or work with a counselor.  Ask people who smoke to not smoke around you.  Avoid places that make you want to smoke, such as:  Bars.  Parties.  Smoke-break areas at work.  Spend time with people who do not smoke.  Lower the stress in your life. Stress can make you want to smoke. Try these things to lower stress:  Getting regular exercise.  Doing deep-breathing exercises.  Doing yoga.  Meditating.  What benefits will I see if I quit smoking?  Over time, you may have:  A better sense of smell and taste.  Less coughing and sore throat.  A slower heart rate.  Lower blood pressure.  Clearer skin.  Better breathing.  Fewer sick days.  Summary  Quitting smoking can be hard, but it is one of  the best things that you can do for your health.  Do not give up if you cannot quit the first time. Some people need to try many times to quit.  When you decide to quit smoking, make a plan to help you succeed.  Quit smoking right away, not slowly over a period of time.  When you start quitting, get help and support to keep you smoke-free.  This information is not intended to replace advice given to you by your health care provider. Make sure you discuss any questions you have with your health care provider.  Document Revised: 12/09/2022 Document Reviewed: 12/09/2022  Elsevier Patient Education © 2024 [employer].

## 2025-05-13 DIAGNOSIS — I10 ESSENTIAL HYPERTENSION: ICD-10-CM

## 2025-05-13 RX ORDER — VELPATASVIR AND SOFOSBUVIR 100; 400 MG/1; MG/1
1 TABLET, FILM COATED ORAL DAILY
Qty: 30 TABLET | OUTPATIENT
Start: 2025-05-13

## 2025-05-13 RX ORDER — TRAZODONE HYDROCHLORIDE 50 MG/1
50 TABLET ORAL
OUTPATIENT
Start: 2025-05-13

## 2025-05-13 RX ORDER — HYDROXYZINE PAMOATE 50 MG/1
50 CAPSULE ORAL 3 TIMES DAILY PRN
OUTPATIENT
Start: 2025-05-13

## 2025-05-13 RX ORDER — LISINOPRIL 10 MG/1
10 TABLET ORAL DAILY
Qty: 30 TABLET | Refills: 0 | OUTPATIENT
Start: 2025-05-13

## 2025-05-13 RX ORDER — NITROGLYCERIN 0.4 MG/1
0.4 TABLET SUBLINGUAL
OUTPATIENT
Start: 2025-05-13

## 2025-05-13 RX ORDER — ALBUTEROL SULFATE 90 UG/1
1 AEROSOL, METERED RESPIRATORY (INHALATION)
OUTPATIENT
Start: 2025-05-13

## 2025-05-13 RX ORDER — ATORVASTATIN CALCIUM 80 MG/1
80 TABLET, FILM COATED ORAL NIGHTLY
Qty: 90 TABLET | OUTPATIENT
Start: 2025-05-13

## 2025-05-14 NOTE — TELEPHONE ENCOUNTER
Left message letting the patient know that we cannot refill his medications due to not seeing Dr. Arguelles since 2021. He has a new patient appointment with a different doctor and advised him to call her.   **hub to read**

## 2025-07-01 ENCOUNTER — REFERRAL TRIAGE (OUTPATIENT)
Dept: CASE MANAGEMENT | Facility: CLINIC | Age: 60
End: 2025-07-01
Payer: OTHER GOVERNMENT

## 2025-07-01 ENCOUNTER — OFFICE VISIT (OUTPATIENT)
Dept: FAMILY MEDICINE CLINIC | Facility: CLINIC | Age: 60
End: 2025-07-01
Payer: OTHER GOVERNMENT

## 2025-07-01 ENCOUNTER — PATIENT OUTREACH (OUTPATIENT)
Dept: CASE MANAGEMENT | Facility: CLINIC | Age: 60
End: 2025-07-01
Payer: OTHER GOVERNMENT

## 2025-07-01 ENCOUNTER — PATIENT ROUNDING (BHMG ONLY) (OUTPATIENT)
Dept: FAMILY MEDICINE CLINIC | Facility: CLINIC | Age: 60
End: 2025-07-01
Payer: OTHER GOVERNMENT

## 2025-07-01 VITALS
SYSTOLIC BLOOD PRESSURE: 142 MMHG | DIASTOLIC BLOOD PRESSURE: 86 MMHG | RESPIRATION RATE: 17 BRPM | WEIGHT: 170 LBS | BODY MASS INDEX: 28.32 KG/M2 | HEIGHT: 65 IN | OXYGEN SATURATION: 97 % | HEART RATE: 69 BPM | TEMPERATURE: 98.6 F

## 2025-07-01 DIAGNOSIS — Z12.5 SCREENING FOR PROSTATE CANCER: ICD-10-CM

## 2025-07-01 DIAGNOSIS — B19.20 HEPATITIS C VIRUS INFECTION WITHOUT HEPATIC COMA, UNSPECIFIED CHRONICITY: ICD-10-CM

## 2025-07-01 DIAGNOSIS — Z86.73 HISTORY OF STROKE: ICD-10-CM

## 2025-07-01 DIAGNOSIS — M17.11 PRIMARY OSTEOARTHRITIS OF RIGHT KNEE: ICD-10-CM

## 2025-07-01 DIAGNOSIS — Z72.0 TOBACCO USE: ICD-10-CM

## 2025-07-01 DIAGNOSIS — G89.29 CHRONIC PAIN OF RIGHT KNEE: Primary | ICD-10-CM

## 2025-07-01 DIAGNOSIS — M25.561 CHRONIC PAIN OF RIGHT KNEE: Primary | ICD-10-CM

## 2025-07-01 DIAGNOSIS — Q21.12 PFO (PATENT FORAMEN OVALE): Chronic | ICD-10-CM

## 2025-07-01 DIAGNOSIS — I10 ESSENTIAL HYPERTENSION: Primary | ICD-10-CM

## 2025-07-01 DIAGNOSIS — I63.9 CRYPTOGENIC STROKE: Chronic | ICD-10-CM

## 2025-07-01 DIAGNOSIS — Z13.1 SCREENING FOR DIABETES MELLITUS: ICD-10-CM

## 2025-07-01 DIAGNOSIS — I82.462 ACUTE DEEP VEIN THROMBOSIS (DVT) OF CALF MUSCLE VEIN OF LEFT LOWER EXTREMITY: ICD-10-CM

## 2025-07-01 DIAGNOSIS — I10 ESSENTIAL HYPERTENSION: ICD-10-CM

## 2025-07-01 RX ORDER — ATORVASTATIN CALCIUM 80 MG/1
80 TABLET, FILM COATED ORAL DAILY
Qty: 90 TABLET | Refills: 0 | Status: SHIPPED | OUTPATIENT
Start: 2025-07-01

## 2025-07-01 RX ORDER — LISINOPRIL 10 MG/1
10 TABLET ORAL DAILY
Qty: 30 TABLET | Refills: 0 | Status: SHIPPED | OUTPATIENT
Start: 2025-07-01

## 2025-07-01 NOTE — OUTREACH NOTE
AMBULATORY CASE MANAGEMENT NOTE    Names and Relationships of Patient/Support Persons: Contact: Miguel Leung; Relationship: Self -     CCM Interim Update    Patient in office for appointment with PCP.  ACM met with patient and friend Elida in office regarding provider referral, introduced self and role.  Discussed the purpose, goals, and expectations of the CCM program.   Patient consented to CCM.    Discussed health needs.  Patient stopped taking all medications, PCP ordered medications to [facility] pharmacy, friend will pick medications up for patient.  Patient verified that he does have a pill organizer and will use this.  ACM will help monitor for compliance with medications.  Patient does not have a blood pressure monitor, needs to monitor and log his blood pressure.  ACM will assist with obtaining this.    Discussed SDOH needs, patient was experiencing homelessness, ACM unable to determine at this time if he still is.  He does need transportation resources as friend takes him to his appointments as she is able.  ACM discussed transportation benefits available to patient through his insurance.  [facility] will obtain this information and mail to address on file, send via [employer].    Patient is agreeable to [facility] outreach later this week to follow up.  Provided patient with this AC's direct contact information.    PLAN:  Housing?  Mail transportation benefit information  Picked up meds?  Blood pressure monitor through insurance    Adult Patient Profile  Questions/Answers      Flowsheet Row Most Recent Value   Symptoms/Conditions Managed at Home cardiovascular   Barriers to Managing Health financial resources, age, social support, stress of chronic illness, understanding health advice   Cardiovascular Symptoms/Conditions hypertension   Cardiovascular Management Strategies routine screening, medication therapy   Cardiovascular Self-Management Outcome unsure   Cardiovascular Comment Needs BP cuff, has not been compliant  with medications.          Send Education  Questions/Answers      Flowsheet Row Most Recent Value   Other Patient Education/Resources  24/7 [facility] Nurse Call Line, Transportation   24/7 Nurse Call Line Education Method Send Materials  [Mailed]   Transportation Education Method --  [Mail]          SDOH updated and reviewed with the patient during this program:  Financial Resource Strain: Low Risk  (7/1/2025)    Overall Financial Resource Strain (CARDIA)     Difficulty of Paying Living Expenses: Not hard at all      --     Food Insecurity: No Food Insecurity (7/1/2025)    Hunger Vital Sign     Worried About Running Out of Food in the Last Year: Never true     Ran Out of Food in the Last Year: Never true      --     Housing Stability: High Risk (7/1/2025)    Housing Stability Vital Sign     Unable to Pay for Housing in the Last Year: Patient unable to answer     Homeless in the Last Year: Yes      --     Transportation Needs: No Transportation Needs (7/1/2025)    PRAPARE - Transportation     Lack of Transportation (Medical): No     Lack of Transportation (Non-Medical): No       Education Documentation  Provider Follow-Up, taught by Marge Viveros, RN at 7/1/2025  4:21 PM.  Learner: Other, Patient  Readiness: Acceptance  Method: Explanation  Response: Verbalizes Understanding    Medication Management, taught by Marge Viveros, RN at 7/1/2025  4:21 PM.  Learner: Other, Patient  Readiness: Acceptance  Method: Explanation  Response: Verbalizes Understanding    Blood Pressure Monitoring, taught by Marge Viveros, RN at 7/1/2025  4:21 PM.  Learner: Other, Patient  Readiness: Acceptance  Method: Explanation  Response: Verbalizes Understanding          Marge POWERS  Ambulatory Case Management    7/1/2025, 16:21 EDT

## 2025-07-01 NOTE — PATIENT INSTRUCTIONS
I recommend tobacco cessation.  You may call [address] [place], [place] [address]NOW for more resources and information.   Please measure blood pressure daily, keep record, bring with you to follow up.   Needs to see cardiology and hematology.  Go to er if severe pain, difficulty breathing, palpitations, fever.  Continue current medications and treatment.   Call office for lab/test results if you have not received a call from our office or [employer] message in 1-2 days.    Will obtain records of hcv treatment from [employer].

## 2025-07-01 NOTE — PLAN OF CARE
Problem: Hypertension  Goal: Track and Manage My Blood Pressure  Outcome: Not Progressing  Intervention: My Blood Pressure Management To Do List  Description: Why is this important?You may not be able to feel high blood pressure, but it can still hurt your body.High blood pressure can cause heart or kidney problems. It can also cause a stroke.Checking your blood pressure at home and at different times of the day can help to control blood pressure.  Flowsheets (Taken 7/1/2025 1605)  My Blood Pressure Management To Do List: (Does not have BP monitor, working on obtaining) --  Goal: Stop or Cut Down Tobacco/Nicotine Use  Outcome: Not Progressing  Goal: Mange My Medicine  Outcome: Not Progressing  Intervention: My Medication Management To Do List  Description: Why is this important?It is important to take your medicine so that you can control your condition and prevent problems.Even if you do not feel your high blood pressure, it is still important to take your medicine.Call the office if you cannot afford the medicine or if you have questions about it.  Flowsheets (Taken 7/1/2025 1605)  My Medication Management To Do List:   use an andrew to help me remember   keep a list of all the medicines and supplements I take     Problem: Hypertension  Goal: Optimal Care Coordination of a Patient Experiencing Hypertension  Outcome: Progressing  Intervention: Identify and Monitor Blood Pressure Elevation  Flowsheets (Taken 7/1/2025 1605)  Identify and Monitor Blood Pressure Elevation: home or ambulatory blood pressure monitoring encouraged  Intervention: Mutually Develop and Foster Achievement of Patient Goals  Flowsheets (Taken 7/1/2025 1605)  Mutually Develop and Foster Achievement of Patient Goals: (Working on obtaining a BP monitor)   barriers to meeting goals identified   resources needed to meet goals identified   reassurance provided  Intervention: Facilitate Adherence to Lifestyle Change  Flowsheets (Taken 7/1/2025  1602)  Facilitate Adherence to Lifestyle Change: barriers to lifestyle changes identified  Intervention: Alleviate Barriers to Medication Regimen  Flowsheets (Taken 7/1/2025 1603)  Alleviate Barriers to Medication Regimen: barriers to medication adherence identified

## 2025-07-01 NOTE — PROGRESS NOTES
July 1, 2025    Hello, may I speak with Miguel Leung?    My name is Gina      I am  with [facility]  [address] [place] [address].    Before we get started may I verify your date of birth? 1965    I am calling to officially welcome you to our practice and ask about your recent visit. Is this a good time to talk? yes    Tell me about your visit with us. What things went well?  My visit was great. You all are awesome. Thanks for all your help.       We're always looking for ways to make our patients' experiences even better. Do you have recommendations on ways we may improve?  no    Overall were you satisfied with your first visit to our practice? yes       I appreciate you taking the time to speak with me today. Is there anything else I can do for you? no      Thank you, and have a great day.

## 2025-07-01 NOTE — PROGRESS NOTES
Subjective        Miguel Leung is a 59 y.o. male who presents to [facility].     Chief Complaint   Patient presents with    Knee Pain     Right side. Started 5 years ago.     Establish Care       History of Present Illness    Patient presents to clinic with complaint of right knee pain.  Previous PCP was Dr. Villalpando, he states thought this appointment today was with Dr. Villalpando.  He is not sure if he wishes to establish care here or return to Dr. Villalpando.  This is my first time evaluating patient.  he is accompanied by his friend Elida who assists with providing history. He lives with his friend, Verena.  Elida states he stopped taking all of his medications at least 3-4 months ago for unknown reasons.    Right knee pain - chronic - states has had steroid shots in past that help for a few months but pain worsens again.  Elida is requesting referral to Dr. Gary Andrea at [facility].  He denies prior injury to knee.  He was previously homeless, slept on the streets a lot, thinks this worsened knee pain.  Previous xrays right knee 11/2020 showed 10mm defect in medial femoral condyle concerning for subchondral fx or osteochondral defect, moderate arthritic changes.  Knee pain is constant, sharp, is mild to moderate, waxes and wanes, is unsure of aggravating or alleviating factors, he feels like his knee looks deformed, can't be sure if it swells. He has taken asa for pain without improvement, has not tried tylenol or nsaids. He does not think he has ever participated in physical therapy for his knee.   Hypertension - previously took lisinopril 10mg daily, stopped for unknown reason.  Denies chest pain, dizziness, or palpitations.  No swelling of ankles or legs.  Occasionally with shortness of breath which he reports is chronic and not a new complaint, denies prior dx of copd, he continues to smoke cigarettes.   PFO with interatrial septal aneurysm s/p PFO repair  12/2024 by Dr. Navarrete- last saw cardiology Jessi Hui 1/3/2025.  He was advised to follow-up with cardiology Dr. Gaona in 3 months but was lost to follow-up.  She advised him to continue Eliquis for previous unprovoked DVT and Plavix 5 mg daily with atorvastatin 80 mg daily, he stopped taking these medications a few months ago for unknown reasons.  Dr. Gaona recommended patient see hematology during evaluation 12/2024 but patient was lost to follow up.    unprovoked DVT-doppler 10/2024 showed subacute left lower extremity dvt in gastrocnemius.  Currently he denies calf pain, calf swelling, or calf redness.  Previously took Eliquis 5 mg twice daily, stopped this on his own accord a few months ago for unknown reasons.  Previous stroke-patient cannot recall specifics.  Per review of records from [facility], patient experienced stroke due to thrombus of right middle cerebral artery with noted right lateral ventricular mass in  9/2024.  He did not receive tPA, was taken to [facility] with TICI 3 reperfusion.  Eval notes, they noted ES US infarct but consider likely thromboembolic in setting of amphetamine/opiate use.  He was evaluated by neurosurgery Dr. Harvey.  Will request copy of mri/records.  It was recommended he have cardiac event/loop monitor and follow-up with cardiology.    Medical conditions include hypertension, HCV with tx with epclusa through [facility] - cannot say when he was treated or if svr after treatment.  He is alert and oriented but is a poor historian.  He does not believe he is seeing a neurologist as an outpatient.    Unfortunately he continues to smoke cigarettes.  He has a history of substance abuse, states has been sober since 7/3/2024.     The following portions of the patient's history were reviewed and updated as appropriate: allergies, current medications, past family history, past medical history, past social history, past surgical history and problem  "list.    No Known Allergies       Current Outpatient Medications:     clopidogrel (PLAVIX) 75 MG tablet, Take 1 tablet by mouth Daily., Disp: 90 tablet, Rfl: 0    lisinopril (PRINIVIL,ZESTRIL) 10 MG tablet, Take 1 tablet by mouth Daily., Disp: 30 tablet, Rfl: 0    atorvastatin (Lipitor) 80 MG tablet, Take 1 tablet by mouth Daily., Disp: 90 tablet, Rfl: 0    Review of Systems     Objective     /86 (BP Location: Left arm, Patient Position: Sitting, Cuff Size: Adult)   Pulse 69   Temp 98.6 °F (37 °C) (Oral)   Resp 17   Ht 165.1 cm (65\")   Wt 77.1 kg (170 lb)   SpO2 97%   BMI 28.29 kg/m²   BMI is >= 25 and <30. (Overweight) The following options were offered after discussion;: information on healthy weight added to patient's after visit summary      Physical Exam  Vitals and nursing note reviewed.   Constitutional:       General: He is not in acute distress.     Appearance: Normal appearance. He is not ill-appearing or diaphoretic.      Comments: Poor dentition   HENT:      Head: Normocephalic and atraumatic.      Right Ear: Tympanic membrane, ear canal and external ear normal.      Left Ear: Tympanic membrane, ear canal and external ear normal.      Nose: Nose normal.      Mouth/Throat:      Mouth: Mucous membranes are moist.   Eyes:      General: No scleral icterus.     Conjunctiva/sclera: Conjunctivae normal.      Pupils: Pupils are equal, round, and reactive to light.   Neck:      Trachea: Trachea normal.   Cardiovascular:      Rate and Rhythm: Normal rate and regular rhythm.      Pulses: Normal pulses.      Heart sounds: Normal heart sounds.   Pulmonary:      Effort: Pulmonary effort is normal. No respiratory distress.      Breath sounds: Normal breath sounds and air entry.   Abdominal:      General: Bowel sounds are normal. There is no distension.      Palpations: Abdomen is soft. There is no mass.      Tenderness: There is no abdominal tenderness. There is no guarding or rebound.   Musculoskeletal:   "       General: Normal range of motion.      Cervical back: Normal range of motion and neck supple. No rigidity.      Right knee: Swelling and bony tenderness present. No ecchymosis or crepitus. No ACL laxity.      Left knee: No swelling, ecchymosis, bony tenderness or crepitus. No ACL laxity.     Right lower leg: No edema.      Left lower leg: No edema.        Legs:       Comments: Right medial and lateral joint lines-tenderness to palpation, no warmth or erythema, mild swelling   Lymphadenopathy:      Cervical: No cervical adenopathy.   Skin:     General: Skin is warm and dry.      Capillary Refill: Capillary refill takes less than 2 seconds.      Coloration: Skin is not jaundiced.   Neurological:      General: No focal deficit present.      Mental Status: He is alert and oriented to person, place, and time.      Sensory: Sensation is intact.      Motor: Motor function is intact.      Coordination: Coordination is intact.      Gait: Gait abnormal (slow).      Deep Tendon Reflexes: Reflexes are normal and symmetric.   Psychiatric:         Attention and Perception: Attention normal.         Mood and Affect: Mood and affect normal.         Speech: Speech normal.         Behavior: Behavior normal. Behavior is cooperative.         Thought Content: Thought content normal.         Cognition and Memory: Cognition normal.         Judgment: Judgment normal.      Comments: Poor historian         PHQ-2 Depression Screening  Little interest or pleasure in doing things? Not at all   Feeling down, depressed, or hopeless? Not at all   PHQ-2 Total Score 0         Result Review    The following data was reviewed by: DEANN Ascencio on 07/01/2025:  CMP          11/8/2024    11:00 12/10/2024    12:22   CMP   Glucose 89  95    BUN 10  9    Creatinine 0.57  0.64    EGFR 111.5  107.7    Sodium 140  140    Potassium 4.2  3.9    Chloride 107  105    Calcium 8.3  9.2    Total Protein  8.7    Albumin  4.0    Globulin  4.7    Total  Bilirubin  1.3    Alkaline Phosphatase  92    AST (SGOT)  21    ALT (SGPT)  10    Albumin/Globulin Ratio  0.9    BUN/Creatinine Ratio 17.5  14.1    Anion Gap 8.9  11.1      CBC          12/10/2024    12:22   CBC   WBC 12.66    RBC 5.25    Hemoglobin 16.2    Hematocrit 48.5    MCV 92.4    MCH 30.9    MCHC 33.4    RDW 13.7    Platelets 348      CBC w/diff          12/10/2024    12:22   CBC w/Diff   WBC 12.66    RBC 5.25    Hemoglobin 16.2    Hematocrit 48.5    MCV 92.4    MCH 30.9    MCHC 33.4    RDW 13.7    Platelets 348    Neutrophil Rel % 74.1    Immature Granulocyte Rel % 0.7    Lymphocyte Rel % 16.4    Monocyte Rel % 7.5    Eosinophil Rel % 1.0    Basophil Rel % 0.3                  DATE OF EXAM:  11/30/2020 4:24 PM     PROCEDURE:  XR KNEE 4+ VW RIGHT-     INDICATIONS:  right knee pain     COMPARISON:  Right knee radiographs 10/30/2018.      TECHNIQUE:   Four radiologic views or more of the right knee were obtained.     FINDINGS:  Defect in the medial femoral condyle is new compared to 2018 x-ray,  measuring approximately 10 x 3, with surrounding sclerosis. There has  been progression of joint space narrowing and osteophyte formation in  the medial compartment. Osteophytes in the lateral compartment are new.  There is a suspected joint effusion. Atherosclerotic vascular  calcifications are noted.      IMPRESSION:  1.New 10 mm defect in the medial femoral condyle, concerning for  subchondral fracture or osteochondral defect.  2.Progression of mild to moderate arthritic changes compared to 2018  x-rays.     Electronically Signed By-Rani Youssef MD On:11/30/2020 4:59 PM  This report was finalized on [id number] by  Rani Youssef MD.   HEART RATE=69  bpm  RR Tuzlrvac=957  ms  WY Sacjiuwj=008  ms  P Horizontal Axis=-19  deg  P Front Axis=53  deg  QRSD Interval=94  ms  QT Ftwqhydb=430  ms  ZTkX=731  ms  QRS Axis=-2  deg  T Wave Axis=-11  deg  - OTHERWISE NORMAL ECG -  Sinus rhythm  Low voltage, extremity  leads  No previous ECG available for comparison  Electronically Signed By: Andre Godinez (AGUSTO) 2024-12-11 08:58:33  Date and Time of Study:2024-12-11 04:12:58    Duplex scan of extremity veins including responses to compression and other maneuvers; bilateral    Accession Number: [id number]   Date of Study: 10/30/24   Ordering Provider: Dario Gaona MD    Clinical Indications: Paradoxical emboli        [facility]  Performing Staff   Cardiology: Rhys Ornelas MD     Tech: Georgina Song, RVT         Clinical Indication    Paradoxical emboli   Dx: History of CVA (cerebrovascular accident) [Z86.73 (ICD-10-CM)]; Essential hypertension [I10 (ICD-10-CM)]; Dyslipidemia [E78.5 (ICD-10-CM)]; PFO (patent foramen ovale) [Q21.12 (ICD-10-CM)]     Interpretation Summary         Sub-acute left lower extremity deep vein thrombosis noted in the gastrocnemius.    All other veins appeared normal bilaterally.    XR CHEST 1 VW     Date of Exam: 12/10/2024 12:36 PM EST     Indication: Preop     Comparison: None available.     Findings:  No acute airspace disease. Benign calcified granuloma in the periphery of the left midlung. Incidental note is made of bilateral nipple shadows. No pleural effusion or pneumothorax or acute osseous abnormalities are identified     IMPRESSION:  Impression:  No acute chest finding.        Electronically Signed: Melba Mendoza MD    12/10/2024 1:00 PM EST       Assessment & Plan    Diagnoses and all orders for this visit:    1. Chronic pain of right knee (Primary)  -     Ambulatory Referral to Case Management Disease Education, Caregiving/Support, Care Coordination, Medication Adherence, Barriers to Care, Preventative Care  -     Ambulatory Referral to Orthopedic Surgery  -     XR Knee 3 View Right; Future    2. Primary osteoarthritis of right knee  -     Ambulatory Referral to Case Management Disease Education, Caregiving/Support, Care Coordination, Medication Adherence, Barriers  to Care, Preventative Care    3. Essential hypertension  -     Ambulatory Referral to Cardiology for Hypertension, Other; s/p pfo closure  -     atorvastatin (Lipitor) 80 MG tablet; Take 1 tablet by mouth Daily.  Dispense: 90 tablet; Refill: 0  -     CBC & Differential; Future  -     Comprehensive Metabolic Panel; Future  -     TSH Rfx On Abnormal To Free T4; Future  -     lisinopril (PRINIVIL,ZESTRIL) 10 MG tablet; Take 1 tablet by mouth Daily.  Dispense: 30 tablet; Refill: 0  -     Blood Pressure Device    4. Tobacco use  -     Ambulatory Referral to Case Management Disease Education, Caregiving/Support, Care Coordination, Medication Adherence, Barriers to Care, Preventative Care  -     US Venous Doppler Lower Extremity Bilateral (duplex); Future  -     Ambulatory Referral to Neurology    5. PFO (patent foramen ovale)  -     Ambulatory Referral to Case Management Disease Education, Caregiving/Support, Care Coordination, Medication Adherence, Barriers to Care, Preventative Care  -     Ambulatory Referral to Cardiology for Hypertension, Other; s/p pfo closure  -     CBC & Differential; Future  -     Comprehensive Metabolic Panel; Future  -     Lipid Panel; Future  -     clopidogrel (PLAVIX) 75 MG tablet; Take 1 tablet by mouth Daily.  Dispense: 90 tablet; Refill: 0    6. Sub-Acute deep vein thrombosis (DVT) of calf muscle vein of left lower extremity  -     Ambulatory Referral to Case Management Disease Education, Caregiving/Support, Care Coordination, Medication Adherence, Barriers to Care, Preventative Care  -     Ambulatory Referral to Hematology  -     US Venous Doppler Lower Extremity Bilateral (duplex); Future  -     clopidogrel (PLAVIX) 75 MG tablet; Take 1 tablet by mouth Daily.  Dispense: 90 tablet; Refill: 0    7. History of stroke  -     Ambulatory Referral to Case Management Disease Education, Caregiving/Support, Care Coordination, Medication Adherence, Barriers to Care, Preventative Care  -      atorvastatin (Lipitor) 80 MG tablet; Take 1 tablet by mouth Daily.  Dispense: 90 tablet; Refill: 0  -     Lipid Panel; Future  -     Ambulatory Referral to Hematology  -     Blood Pressure Device  -     clopidogrel (PLAVIX) 75 MG tablet; Take 1 tablet by mouth Daily.  Dispense: 90 tablet; Refill: 0  -     Ambulatory Referral to Neurology    8. Hepatitis C virus infection without hepatic coma, unspecified chronicity  -     Hepatitis C Virus (HCV) RNA, Diagnosis; Future  -     Hepatitis C Genotype; Future  -     Hepatitis C Antibody; Future    9. Screening for diabetes mellitus  -     Hemoglobin A1c; Future    10. Screening for prostate cancer  -     PSA Screen; Future       Patient Instructions   I recommend tobacco cessation.  You may call [facility] for more resources and information.   Please measure blood pressure daily, keep record, bring with you to follow up.   Needs to see cardiology and hematology.  Go to er if severe pain, difficulty breathing, palpitations, fever.  Continue current medications and treatment.   Call office for lab/test results if you have not received a call from our office or [employer] message in 1-2 days.    Will obtain records of hcv treatment from [facility].     Referral placed to f/u with [facility] stroke team.  Message sent to Dr. Gaona, plavix restarted for now.  If DVT present on doppler, will need to restart eliquis with further recommendations by hematology/stroke team.  Needs to see gi for cleanup therapy if HCV RNA present on labs s/p previous tx of hcv by [facility].       Follow Up   Return in about 4 weeks (around 7/29/2025) for Recheck, Hypertension, Hyperlipidemia discuss labs.    Patient was given instructions and counseling regarding his condition or for health maintenance advice. Please see specific information pulled into the AVS if appropriate.     Nallely Gloria, APRN     07/03/25

## 2025-07-02 RX ORDER — CLOPIDOGREL BISULFATE 75 MG/1
75 TABLET ORAL DAILY
Qty: 90 TABLET | Refills: 0 | Status: SHIPPED | OUTPATIENT
Start: 2025-07-02

## 2025-07-03 ENCOUNTER — LAB (OUTPATIENT)
Dept: LAB | Facility: HOSPITAL | Age: 60
End: 2025-07-03
Payer: OTHER GOVERNMENT

## 2025-07-03 ENCOUNTER — HOSPITAL ENCOUNTER (OUTPATIENT)
Dept: GENERAL RADIOLOGY | Facility: HOSPITAL | Age: 60
Discharge: HOME | End: 2025-07-03
Payer: OTHER GOVERNMENT

## 2025-07-03 DIAGNOSIS — G89.29 CHRONIC PAIN OF RIGHT KNEE: ICD-10-CM

## 2025-07-03 DIAGNOSIS — Q21.12 PFO (PATENT FORAMEN OVALE): ICD-10-CM

## 2025-07-03 DIAGNOSIS — I10 ESSENTIAL HYPERTENSION: ICD-10-CM

## 2025-07-03 DIAGNOSIS — Z12.5 SCREENING FOR PROSTATE CANCER: ICD-10-CM

## 2025-07-03 DIAGNOSIS — Z13.1 SCREENING FOR DIABETES MELLITUS: ICD-10-CM

## 2025-07-03 DIAGNOSIS — B19.20 HEPATITIS C VIRUS INFECTION WITHOUT HEPATIC COMA, UNSPECIFIED CHRONICITY: ICD-10-CM

## 2025-07-03 DIAGNOSIS — M25.561 CHRONIC PAIN OF RIGHT KNEE: ICD-10-CM

## 2025-07-03 DIAGNOSIS — Z86.73 HISTORY OF STROKE: ICD-10-CM

## 2025-07-03 LAB
ALBUMIN SERPL-MCNC: 4.1 G/DL (ref 3.5–5.2)
ALBUMIN/GLOB SERPL: 1.2 G/DL
ALP SERPL-CCNC: 92 U/L (ref 39–117)
ALT SERPL W P-5'-P-CCNC: 6 U/L (ref 1–41)
ANION GAP SERPL CALCULATED.3IONS-SCNC: 10.6 MMOL/L (ref 5–15)
AST SERPL-CCNC: 13 U/L (ref 1–40)
BASOPHILS # BLD AUTO: 0.05 10*3/MM3 (ref 0–0.2)
BASOPHILS NFR BLD AUTO: 0.6 % (ref 0–1.5)
BILIRUB SERPL-MCNC: 1.1 MG/DL (ref 0–1.2)
BUN SERPL-MCNC: 9 MG/DL (ref 8–23)
BUN/CREAT SERPL: 11.7 (ref 7–25)
CALCIUM SPEC-SCNC: 9.1 MG/DL (ref 8.6–10.5)
CHLORIDE SERPL-SCNC: 103 MMOL/L (ref 98–107)
CHOLEST SERPL-MCNC: 195 MG/DL (ref 0–200)
CO2 SERPL-SCNC: 23.4 MMOL/L (ref 22–29)
CREAT SERPL-MCNC: 0.77 MG/DL (ref 0.76–1.27)
DEPRECATED RDW RBC AUTO: 46 FL (ref 37–54)
EGFRCR SERPLBLD CKD-EPI 2021: 101.9 ML/MIN/1.73
EOSINOPHIL # BLD AUTO: 0.1 10*3/MM3 (ref 0–0.4)
EOSINOPHIL NFR BLD AUTO: 1.3 % (ref 0.3–6.2)
ERYTHROCYTE [DISTWIDTH] IN BLOOD BY AUTOMATED COUNT: 13.5 % (ref 12.3–15.4)
GLOBULIN UR ELPH-MCNC: 3.3 GM/DL
GLUCOSE SERPL-MCNC: 81 MG/DL (ref 65–99)
HBA1C MFR BLD: 5.42 % (ref 4.8–5.6)
HCT VFR BLD AUTO: 50.2 % (ref 37.5–51)
HCV AB SER QL: REACTIVE
HDLC SERPL-MCNC: 41 MG/DL (ref 40–60)
HGB BLD-MCNC: 16.7 G/DL (ref 13–17.7)
IMM GRANULOCYTES # BLD AUTO: 0.03 10*3/MM3 (ref 0–0.05)
IMM GRANULOCYTES NFR BLD AUTO: 0.4 % (ref 0–0.5)
LDLC SERPL CALC-MCNC: 140 MG/DL (ref 0–100)
LDLC/HDLC SERPL: 3.37 {RATIO}
LYMPHOCYTES # BLD AUTO: 1.93 10*3/MM3 (ref 0.7–3.1)
LYMPHOCYTES NFR BLD AUTO: 24.6 % (ref 19.6–45.3)
MCH RBC QN AUTO: 30.8 PG (ref 26.6–33)
MCHC RBC AUTO-ENTMCNC: 33.3 G/DL (ref 31.5–35.7)
MCV RBC AUTO: 92.6 FL (ref 79–97)
MONOCYTES # BLD AUTO: 0.82 10*3/MM3 (ref 0.1–0.9)
MONOCYTES NFR BLD AUTO: 10.4 % (ref 5–12)
NEUTROPHILS NFR BLD AUTO: 4.92 10*3/MM3 (ref 1.7–7)
NEUTROPHILS NFR BLD AUTO: 62.7 % (ref 42.7–76)
NRBC BLD AUTO-RTO: 0 /100 WBC (ref 0–0.2)
PLATELET # BLD AUTO: 310 10*3/MM3 (ref 140–450)
PMV BLD AUTO: 8.9 FL (ref 6–12)
POTASSIUM SERPL-SCNC: 4.2 MMOL/L (ref 3.5–5.2)
PROT SERPL-MCNC: 7.4 G/DL (ref 6–8.5)
PSA SERPL-MCNC: 1.43 NG/ML (ref 0–4)
RBC # BLD AUTO: 5.42 10*6/MM3 (ref 4.14–5.8)
SODIUM SERPL-SCNC: 137 MMOL/L (ref 136–145)
TRIGL SERPL-MCNC: 79 MG/DL (ref 0–150)
TSH SERPL DL<=0.05 MIU/L-ACNC: 1.76 UIU/ML (ref 0.27–4.2)
VLDLC SERPL-MCNC: 14 MG/DL (ref 5–40)
WBC NRBC COR # BLD AUTO: 7.85 10*3/MM3 (ref 3.4–10.8)

## 2025-07-03 PROCEDURE — 87522 HEPATITIS C REVRS TRNSCRPJ: CPT

## 2025-07-03 PROCEDURE — 87902 NFCT AGT GNTYP ALYS HEP C: CPT

## 2025-07-03 PROCEDURE — 86803 HEPATITIS C AB TEST: CPT

## 2025-07-03 PROCEDURE — 84443 ASSAY THYROID STIM HORMONE: CPT

## 2025-07-03 PROCEDURE — G0103 PSA SCREENING: HCPCS

## 2025-07-03 PROCEDURE — 80061 LIPID PANEL: CPT

## 2025-07-03 PROCEDURE — 85025 COMPLETE CBC W/AUTO DIFF WBC: CPT

## 2025-07-03 PROCEDURE — 83036 HEMOGLOBIN GLYCOSYLATED A1C: CPT

## 2025-07-03 PROCEDURE — 73562 X-RAY EXAM OF KNEE 3: CPT

## 2025-07-03 PROCEDURE — 80053 COMPREHEN METABOLIC PANEL: CPT

## 2025-07-03 PROCEDURE — 36415 COLL VENOUS BLD VENIPUNCTURE: CPT

## 2025-07-04 LAB
HCV RNA SERPL NAA+PROBE-ACNC: NORMAL IU/ML
TEST INFORMATION: NORMAL

## 2025-07-06 LAB — HCV GENTYP SERPL NAA+PROBE: NORMAL

## 2025-07-07 ENCOUNTER — RESULTS FOLLOW-UP (OUTPATIENT)
Dept: LAB | Facility: HOSPITAL | Age: 60
End: 2025-07-07
Payer: OTHER GOVERNMENT

## 2025-07-08 ENCOUNTER — TELEPHONE (OUTPATIENT)
Dept: CASE MANAGEMENT | Facility: CLINIC | Age: 60
End: 2025-07-08
Payer: OTHER GOVERNMENT

## 2025-07-09 ENCOUNTER — TELEPHONE (OUTPATIENT)
Dept: FAMILY MEDICINE CLINIC | Facility: CLINIC | Age: 60
End: 2025-07-09
Payer: OTHER GOVERNMENT

## 2025-07-09 NOTE — TELEPHONE ENCOUNTER
Hub to relay:    Attempted to call patient to assist with scheduling his Ultrasound that Nallely ordered.  No answer and voicemail box is full. Unable to lvm.    Patient needs to call scheduling at [phone] to schedule his Venous Doppler ultrasound.

## 2025-07-14 ENCOUNTER — TELEPHONE (OUTPATIENT)
Dept: CASE MANAGEMENT | Facility: CLINIC | Age: 60
End: 2025-07-14
Payer: OTHER GOVERNMENT

## 2025-07-14 DIAGNOSIS — I82.462 ACUTE DEEP VEIN THROMBOSIS (DVT) OF CALF MUSCLE VEIN OF LEFT LOWER EXTREMITY: Primary | ICD-10-CM

## 2025-07-14 DIAGNOSIS — Z86.73 HISTORY OF STROKE: ICD-10-CM

## 2025-07-14 DIAGNOSIS — Z72.0 TOBACCO USE: ICD-10-CM

## 2025-07-17 ENCOUNTER — TELEPHONE (OUTPATIENT)
Dept: CASE MANAGEMENT | Facility: CLINIC | Age: 60
End: 2025-07-17
Payer: OTHER GOVERNMENT

## 2025-07-22 ENCOUNTER — PATIENT OUTREACH (OUTPATIENT)
Dept: CASE MANAGEMENT | Facility: CLINIC | Age: 60
End: 2025-07-22
Payer: OTHER GOVERNMENT

## 2025-07-22 DIAGNOSIS — Z86.73 HISTORY OF STROKE: ICD-10-CM

## 2025-07-22 DIAGNOSIS — Q21.12 PFO (PATENT FORAMEN OVALE): Primary | Chronic | ICD-10-CM

## 2025-07-22 DIAGNOSIS — I82.462 ACUTE DEEP VEIN THROMBOSIS (DVT) OF CALF MUSCLE VEIN OF LEFT LOWER EXTREMITY: ICD-10-CM

## 2025-07-22 DIAGNOSIS — I63.9 CRYPTOGENIC STROKE: ICD-10-CM

## 2025-07-22 DIAGNOSIS — I10 ESSENTIAL HYPERTENSION: Primary | ICD-10-CM

## 2025-07-22 RX ORDER — APIXABAN 5 MG (74)
5 KIT ORAL SEE ADMIN INSTRUCTIONS
Qty: 74 TABLET | Refills: 0 | Status: SHIPPED | OUTPATIENT
Start: 2025-07-22

## 2025-07-22 NOTE — OUTREACH NOTE
AMBULATORY CASE MANAGEMENT NOTE    Names and Relationships of Patient/Support Persons: Contact: verena landers; Relationship: Emergency Contact -     [facility] Interim Update    Spoke with patient's friend Verena at this time for scheduled ACM outreach.  Verified Verena is on patient's verbal release on file.  Verena verified they have a wrist blood pressure cuff at home, will have patient start taking blood pressure daily and logging it.  Advised patient bring the log with him to his appointment with PCP next week.  Explained differences that can occur with wrist blood pressure cuff vs arm blood pressure cuff, friend verbalized understanding.  Reviewed correct technique for checking blood pressure.      Friend states patient has all of his medications (Plavix, Lisinopril, Lipitor) and she organizes them each week for him.  She reports he is compliant taking these medications.  Discussed pending BLE doppler US, Verena will call to schedule appointment, provided her with contact for centralized scheduling.  Discussed that, per PCP, if patient is not scheduled for US should start Eliquis.  Friend is agreeable to Eliquis being sent in to the [facility] on file.      Verena states patient lives with her, and that she and her friend Elida transport him to and from all of his appointments.  Offered to provide transportation information through patient's insurance as a back up, Verena is agreeable.  Verbally provided Verena with contact information and requirements for setting up transportation.    Discussed specialty appointments, Verena states patient is scheduled to see ortho, cardiology, PCP, and Heme/Onc.  Cardiology appointment is scheduled for next week.  Reviewed lab results from 7/7/25, friend verbalized understanding and agreement to the plan of care.  She denies any other needs at this time.    PLAN:  ACP/LW  Review cardio appointment 7/29  Attended Ortho appointment? Plan?  BLE Doppler  scheduled/completed?  Picked up Eliquis?  Review PCP appointment 7/30  Blood pressure log?    Send Education  Questions/Answers      Flowsheet Row Most Recent Value   Other Patient Education/Resources  Transportation   Transportation Education Method Verbal            Education Documentation  Unresolved/Worsening Symptoms, taught by Marge Viveros RN at 7/22/2025 12:19 PM.  Learner: Caregiver  Readiness: Acceptance  Method: Explanation  Response: Verbalizes Understanding    Self-Care, taught by Marge Viveros RN at 7/22/2025 12:19 PM.  Learner: Caregiver  Readiness: Acceptance  Method: Explanation  Response: Verbalizes Understanding    Provider Follow-Up, taught by Marge Viveros, RN at 7/22/2025 12:19 PM.  Learner: Caregiver  Readiness: Acceptance  Method: Explanation  Response: Verbalizes Understanding    Medication Management, taught by Marge Viveros RN at 7/22/2025 12:19 PM.  Learner: Caregiver  Readiness: Acceptance  Method: Explanation  Response: Verbalizes Understanding    Blood Pressure Monitoring, taught by Marge Viveros, RN at 7/22/2025 12:19 PM.  Learner: Caregiver  Readiness: Acceptance  Method: Explanation  Response: Verbalizes Understanding          Marge POWERS  Ambulatory Case Management    7/22/2025, 12:28 EDT

## 2025-07-29 ENCOUNTER — OFFICE VISIT (OUTPATIENT)
Dept: CARDIOLOGY | Facility: CLINIC | Age: 60
End: 2025-07-29
Payer: OTHER GOVERNMENT

## 2025-07-29 VITALS
HEART RATE: 77 BPM | OXYGEN SATURATION: 97 % | DIASTOLIC BLOOD PRESSURE: 78 MMHG | HEIGHT: 65 IN | SYSTOLIC BLOOD PRESSURE: 122 MMHG | WEIGHT: 168 LBS | BODY MASS INDEX: 27.99 KG/M2

## 2025-07-29 DIAGNOSIS — E78.5 DYSLIPIDEMIA: ICD-10-CM

## 2025-07-29 DIAGNOSIS — Z86.73 HISTORY OF CVA (CEREBROVASCULAR ACCIDENT): ICD-10-CM

## 2025-07-29 DIAGNOSIS — I10 ESSENTIAL HYPERTENSION: ICD-10-CM

## 2025-07-29 DIAGNOSIS — I21.19 INFERIOR MYOCARDIAL INFARCTION: ICD-10-CM

## 2025-07-29 DIAGNOSIS — I63.9 CRYPTOGENIC STROKE: ICD-10-CM

## 2025-07-29 DIAGNOSIS — R94.31 ABNORMAL EKG: Primary | ICD-10-CM

## 2025-07-29 RX ORDER — LISINOPRIL 10 MG/1
10 TABLET ORAL DAILY
Qty: 30 TABLET | Refills: 0 | Status: SHIPPED | OUTPATIENT
Start: 2025-07-29

## 2025-07-29 NOTE — PROGRESS NOTES
Subjective:     Encounter Date:07/29/2025      Patient ID: Miguel Leung is a 60 y.o. male.    Chief Complaint and history of present illness:    Here for follow-up for CVA, hypertension, history of drug abuse    History of Present Illness:    Mr. Miguel Leung has PMH of    Cryptogenic stroke  Percutaneous PFO closure 12/10/2024  DVT left lower extremity 10/30/2024  Hypertension  History of drug abuse and homelessness  Current cigarette smoker    Here for follow-up.  Patient is a poor historian.  He is accompanied by .  Denies any chest pain or shortness of breath.  EKG is revealing inferior wall MI.  Patient had previous workup which revealed A-fib and PFO.  Patient continues to smoke in spite of counseling.    Patient's arterial blood pressure is 122/78, heart rate 77, O2 sat of 97% on room air.    Sent here for evaluation of hole in the heart closure.  Patient is a poor historian.  Came from [facility] in [place] which reportedly is a shelter.  Patient does not know what medications he takes.  Patient denies any chest pain or shortness of breath.  Patient has been sober for 100 days now.Patient reportedly was at [facility] for stroke.  Patient reportedly had a Monitor which did not reveal A-fib.    Transesophageal echo 11/8/2024 revealed EF of 61 to 65% with moderate LAE, saline test positive for PFO    Patient's arterial blood pressure is 139/98, heart rate 71, O2 sat of 97% on room air.    Labs 7/3/2025 revealed normal TSH, CMP, CBC, A1c.  Lipid profile with cholesterol 195, triglyceride 79, HDL 41, .      Assessment:  :      Percutaneous PFO closure  Abnormal EKG  History of cryptogenic CVA  Hypertension  Dyslipidemia  Inferior wall MI  Cigarette smoker      Recommendations / Plan:        Reviewed EKG results with patient  Counseled on smoking cessation  Review of records reveal that patient had Lexiscan negative for ischemia 8/7/2023.  But EKG changes are  new.  Will schedule repeat stress test.  Will do Lexiscan since patient cannot walk on treadmill due to deconditioning.  Continue medical management with clopidogrel, Eliquis, atorvastatin, lisinopril as tolerated  Follow-up with PMD and heme-onc for DVT.              ECG 12 Lead    Date/Time: 7/29/2025 5:39 PM  Performed by: Dario Gaona MD    Authorized by: Dario Gaona MD  Comparison: compared with previous ECG from 12/11/2024  Comparison to previous ECG: EKG done today reviewed/interpreted by me reveals sinus rhythm with a rate of 76 bpm with Q waves in inferior leads and T wave inversion in lead III and aVF suggestive of inferior wall MI.  This is new compared to previous EKG          ECHOCARDIOGRAM:  Results for orders placed during the hospital encounter of 11/08/24    Adult Transesophageal Echo (NATALIE) W/ Cont if Necessary Per Protocol 11/08/2024  2:36 PM    Interpretation Summary    Left ventricular ejection fraction appears to be 61 - 65%.    Left ventricular diastolic function was normal.    The left atrial cavity is moderately dilated.    Saline test results are positive for right to left atrial level shunt.      STRESS TEST          HEART CATHETERIZATION  Results for orders placed during the hospital encounter of 12/10/24    Cardiac Catheterization/Vascular Study    Conclusion  Procedure indication---cryptogenic stroke with moderate size PFO with interatrial septal aneurysm--patient came for PFO closure    Contrast sedation by the nurse under my supervision  Heparin given during the procedure      Procedures done    1.  Transseptal left atrial access via a PFO  2.  Intracardiac echocardiography  3. Fluoroscopy  4.  Selective cannulation of left superior pulmonary vein  5. Endovascular deployment of PFO occluder which is [place] cardio form septal occluder  6.  Conscious sedation      Procedure note    After Getting a valid consent.  patient was draped in sterile fashion  Timeout was  performed  Conscious sedation performed under my supervision by the nurse.  Total of 3 mg of Versed and 75 mcg of fentanyl was given for sedation analgesia.  Continuous blood pressure, pulse oximetry and respiratory status monitoring was done for total of 45 minutes.  the right groin was infiltrated with local anesthesia and a 9 and 11 Khmer venous sheaths were introduced--an ice catheter was placed in the right atrium to evaluate PFO and measure the size for PFO occluder  A multipurpose catheter was used with the help of a Nguyễn wire and PFO could easily be crossed under fluoroscopy guidance and the wire was left in the left superior pulmonary vein  The multipurpose catheter was exchanged to a stiff Amplatzer wire--the wire was placed in the left superior pulmonary vein  The multipurpose catheter was removed removed after cannulation of left superior pulmonary vein  The septal occluder was then prepped and was loaded over the Amplatzer wire and introduced in the left atrium  The left side of the disc was then deployed in the left atrium and total of 8000 units of heparin was given during the procedure  Slowly the device was withdrawn to the septal aspect with the left side of the disc apposing to the left side of the septum  The sheath was further withdrawn into the right atrium with the deployment of a right-sided disc on the right side of the septum  Position of the device was confirmed on the ice catheter  Excellent apposition of the device was noted on both sides of the septum  The device was then deployed and confirmed on ICE images and fluoroscopy  The device which was deployed is [place] cardio form septal occluder reference number [id number] and serial #[id number]  No procedure related complications        Plan    Overnight observation    Electronically signed by Erick Navarrete MD, 12/10/24, 6:34 PM EST.      Copied text in this portion of the note has been reviewed and is accurate as of 7/31/2025  The  following portions of the patient's history were reviewed and updated as appropriate: allergies, current medications, past family history, past medical history, past social history, past surgical history and problem list.    Assessment:         [facility]       Diagnosis Plan   1. Abnormal EKG  Stress Test With Myocardial Perfusion One Day      2. History of CVA (cerebrovascular accident)  Stress Test With Myocardial Perfusion One Day      3. Essential hypertension  Stress Test With Myocardial Perfusion One Day      4. Dyslipidemia  Stress Test With Myocardial Perfusion One Day      5. Cryptogenic stroke  Stress Test With Myocardial Perfusion One Day      6. Inferior myocardial infarction  Stress Test With Myocardial Perfusion One Day             Plan:               Past Medical History:  Past Medical History:   Diagnosis Date    Hepatitis C     Hypertension     Meningitis spinal     PFO (patent foramen ovale)     Stroke      Past Surgical History:  Past Surgical History:   Procedure Laterality Date    PATENT FORAMEN OVALE CLOSURE  12/10/2024    PATENT FORAMEN OVALE CLOSURE N/A 12/10/2024    Procedure: Patent foramen ovale closure, emailed PFO rep -ML;  Surgeon: Erick Navarrete MD;  Location: [facility] CATH INVASIVE LOCATION;  Service: Cardiovascular;  Laterality: N/A;  with ICE    SPINE SURGERY        Allergies:  No Known Allergies  Home Meds:  Current Meds:     Current Outpatient Medications:     Apixaban Starter Pack (Eliquis DVT/PE Starter Pack) tablet therapy pack, Take two 5 mg tablets by mouth every 12 hours for 7 days. Followed by one 5 mg tablet every 12 hours. (Dispense starter pack if available), Disp: 74 tablet, Rfl: 0    atorvastatin (Lipitor) 80 MG tablet, Take 1 tablet by mouth Daily., Disp: 90 tablet, Rfl: 0    clopidogrel (PLAVIX) 75 MG tablet, Take 1 tablet by mouth Daily., Disp: 90 tablet, Rfl: 0    lisinopril (PRINIVIL,ZESTRIL) 10 MG tablet, TAKE 1 TABLET BY MOUTH DAILY, Disp: 30 tablet, Rfl: 0     "acetaminophen (TYLENOL) 500 MG tablet, Take 1 tablet by mouth Every 8 (Eight) Hours As Needed for Mild Pain or Moderate Pain., Disp: 60 tablet, Rfl: 0  Social History:   Social History     Tobacco Use    Smoking status: Every Day     Current packs/day: 1.00     Average packs/day: 1 pack/day for 1.6 years (1.6 ttl pk-yrs)     Types: Cigarettes     Start date: 2024     Passive exposure: Current    Smokeless tobacco: Never   Substance Use Topics    Alcohol use: Not Currently     Comment: reports previous heavy alcohol use, quit 7/3/24      Family History:  Family History   Family history unknown: Yes              ROS  All other systems are negative         Objective:     Physical Exam  /78   Pulse 77   Ht 165.1 cm (65\")   Wt 76.2 kg (168 lb)   SpO2 97%   BMI 27.96 kg/m²   General:  Appears in no acute distress  Eyes: Sclera is anicteric,  conjunctiva is clear   HEENT:  No JVD.  No carotid bruits  Respiratory: Respirations regular and unlabored at rest.  Clear to auscultation  Cardiovascular: S1,S2 Regular rate and rhythm. .   Extremities: No digital clubbing or cyanosis, no edema  Skin: Color pink. Skin warm and dry to touch. No rashes  No xanthoma  Neuro: Alert and awake.    Lab Reviewed:         Dario Gaona MD  7/31/2025 17:46 EDT      EMR Dragon/Transcription:   \"Dictated utilizing Dragon dictation\".        "

## 2025-07-30 ENCOUNTER — OFFICE VISIT (OUTPATIENT)
Dept: FAMILY MEDICINE CLINIC | Facility: CLINIC | Age: 60
End: 2025-07-30
Payer: OTHER GOVERNMENT

## 2025-07-30 VITALS
HEIGHT: 65 IN | DIASTOLIC BLOOD PRESSURE: 78 MMHG | BODY MASS INDEX: 27.96 KG/M2 | TEMPERATURE: 98.5 F | OXYGEN SATURATION: 97 % | HEART RATE: 71 BPM | WEIGHT: 167.8 LBS | RESPIRATION RATE: 17 BRPM | SYSTOLIC BLOOD PRESSURE: 119 MMHG

## 2025-07-30 DIAGNOSIS — K42.9 UMBILICAL HERNIA WITHOUT OBSTRUCTION AND WITHOUT GANGRENE: ICD-10-CM

## 2025-07-30 DIAGNOSIS — Z72.0 TOBACCO USE: ICD-10-CM

## 2025-07-30 DIAGNOSIS — G89.29 CHRONIC PAIN OF RIGHT KNEE: ICD-10-CM

## 2025-07-30 DIAGNOSIS — Z86.73 HISTORY OF STROKE: ICD-10-CM

## 2025-07-30 DIAGNOSIS — Q21.12 PFO (PATENT FORAMEN OVALE): Chronic | ICD-10-CM

## 2025-07-30 DIAGNOSIS — I82.462 DEEP VEIN THROMBOSIS (DVT) OF CALF MUSCLE VEIN OF LEFT LOWER EXTREMITY, UNSPECIFIED CHRONICITY: ICD-10-CM

## 2025-07-30 DIAGNOSIS — Z78.9 POOR HISTORIAN: ICD-10-CM

## 2025-07-30 DIAGNOSIS — B19.20 HEPATITIS C VIRUS INFECTION WITHOUT HEPATIC COMA, UNSPECIFIED CHRONICITY: ICD-10-CM

## 2025-07-30 DIAGNOSIS — I10 ESSENTIAL HYPERTENSION: Primary | ICD-10-CM

## 2025-07-30 DIAGNOSIS — E78.00 PURE HYPERCHOLESTEROLEMIA: ICD-10-CM

## 2025-07-30 DIAGNOSIS — M25.561 CHRONIC PAIN OF RIGHT KNEE: ICD-10-CM

## 2025-07-30 PROCEDURE — 3078F DIAST BP <80 MM HG: CPT | Performed by: NURSE PRACTITIONER

## 2025-07-30 PROCEDURE — 1160F RVW MEDS BY RX/DR IN RCRD: CPT | Performed by: NURSE PRACTITIONER

## 2025-07-30 PROCEDURE — 1159F MED LIST DOCD IN RCRD: CPT | Performed by: NURSE PRACTITIONER

## 2025-07-30 PROCEDURE — 3074F SYST BP LT 130 MM HG: CPT | Performed by: NURSE PRACTITIONER

## 2025-07-30 PROCEDURE — 99214 OFFICE O/P EST MOD 30 MIN: CPT | Performed by: NURSE PRACTITIONER

## 2025-07-30 RX ORDER — ACETAMINOPHEN 500 MG
500 TABLET ORAL EVERY 8 HOURS PRN
Qty: 60 TABLET | Refills: 0 | Status: SHIPPED | OUTPATIENT
Start: 2025-07-30

## 2025-07-30 NOTE — PATIENT INSTRUCTIONS
Start tylenol and ice for knee pain.  Otherwise Continue current medications and treatment.   Have labs drawn in 2 months.  Call office for lab results if you have not received a call from our office or [employer] message in 1-2 days.    Follow up with specialists as previously advised.  Please call [facility] to set up appt, referral was previously send to them.  Phone number given to Elida.  I recommend tobacco cessation.  You may call [id number] for more resources and information.   Please take blood pressure daily, keep record, bring with you to follow up.

## 2025-07-30 NOTE — PROGRESS NOTES
Subjective        Miguel Leung is a 60 y.o. male who presents to [facility].     Chief Complaint   Patient presents with    Hypertension     4 weeks fl/u    Hyperlipidemia     4 weeks fl/u       History of Present Illness    Patient presents for follow-up of hypertension, hyperlipidemia, stroke.  He is accompanied by his friend, Elida, who assists with providing history.  He is a poor historian and unable to provide history.  He lives with his friend Verena who assists with caring for him.     Hypertension/PFO with interarterial septal aneurysm s/p PFO repair 12/2024/abnormal EKG- evaluated yesterday by cardiology Dr. Gaona, note has not yet been completed for review.  I do see that stress test was ordered by cardiology yesterday.  Lisinopril 10 mg daily was restarted at PCP new patient appointment 7/1/2025.  Plavix 75 mg daily and Eliquis 5 mg twice daily have been restarted after discussing with Jessi ZACARIAS cardiology after his new patient visit 7/1/2025.  He has f/u appt with cardiology 8/22/2025.  He obtained blood pressure cuff, has plan to measure his blood pressure every evening but hasn't started this consistently yet.  He denies chest pain, palpitations, dizziness.  Hyperlipidemia- atorvastatin 80 mg daily was restarted in 7/2025 after he quit taking all of his medications for unknown reason.  Lipid panel 7/2025 with elevated  OTW lipid panel WNL.  No myalgia or jaundice.    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  [facility].  At that time it was recommended he have cardiac event/loop monitor and follow-up with cardiology.  He did not follow-up with [facility] stroke team as previously advised, referral was placed at new patient appointment 7/1/2025, his friend is going to check to see if this  appt has been scheduled. Unfortunately he is still smoking cigarettes.   Subacute DVT left lower extremity-Per Doppler scan 10/2024 - He was advised to have Doppler repeated at new patient visit 7/2025 but has not had this performed yet.  He was also referred to hematology for hypercoagulable workup.  Eliquis 5 mg twice daily and Plavix 75 mg daily have been restarted. He has appt for doppler 8/5/25 and appt with Dr. Pereira 9/30/2025.  Further mgmt of anticoagulation pending results of imaging and hematology input.  History of hepatitis C-states he has been treated with medication in the past.  Labs 7/2025 with positive hepatitis C antibody but RNA not present indicating SVR. 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  Right knee pain - chronic - has f/u apt with Dr. Gary Andrea 9/4/2025 because his friend advised him to be seen there - however he would like to see whomever can see him sooner if another ortho is available.  Knee pain is sharp, constant, worse with walking, waxes and wanes, he feels that knee looks deformed, sometimes knee will pop.  He has not tried ice, heat, tylenol, or nsaids.  Biofreeze has not helped significantly with symptoms.   In remote past, he states steroid injections of knee provided relief.  Xray of right knee 7/2025 showed tricompartmental degenerative joint disease of the right knee, most severe within the medial compartment. He is not interested in physical therapy, he does not drive, has difficulty getting to appointments.      The following portions of the patient's history were reviewed and updated as appropriate: allergies, current medications, past family history, past medical history, past social history, past surgical history and problem list.    No Known Allergies       Current Outpatient Medications:     Apixaban Starter Pack (Eliquis DVT/PE Starter Pack) tablet therapy pack, Take two 5 mg tablets by mouth every 12 hours for 7 days. Followed by one 5 mg tablet every 12 hours.  "(Dispense starter pack if available), Disp: 74 tablet, Rfl: 0    atorvastatin (Lipitor) 80 MG tablet, Take 1 tablet by mouth Daily., Disp: 90 tablet, Rfl: 0    clopidogrel (PLAVIX) 75 MG tablet, Take 1 tablet by mouth Daily., Disp: 90 tablet, Rfl: 0    lisinopril (PRINIVIL,ZESTRIL) 10 MG tablet, TAKE 1 TABLET BY MOUTH DAILY, Disp: 30 tablet, Rfl: 0    acetaminophen (TYLENOL) 500 MG tablet, Take 1 tablet by mouth Every 8 (Eight) Hours As Needed for Mild Pain or Moderate Pain., Disp: 60 tablet, Rfl: 0    Review of Systems     Objective     /78 (BP Location: Left arm, Patient Position: Sitting, Cuff Size: Adult)   Pulse 71   Temp 98.5 °F (36.9 °C) (Oral)   Resp 17   Ht 165.1 cm (65\")   Wt 76.1 kg (167 lb 12.8 oz)   SpO2 97%   BMI 27.92 kg/m²         Physical Exam  Vitals and nursing note reviewed.   Constitutional:       General: He is not in acute distress.     Appearance: Normal appearance. He is not ill-appearing or diaphoretic.   HENT:      Head: Normocephalic and atraumatic.      Nose: Nose normal.      Mouth/Throat:      Mouth: Mucous membranes are moist.   Eyes:      General: No scleral icterus.     Conjunctiva/sclera: Conjunctivae normal.      Pupils: Pupils are equal, round, and reactive to light.   Neck:      Trachea: Trachea normal.   Cardiovascular:      Rate and Rhythm: Normal rate and regular rhythm.      Pulses: Normal pulses.      Heart sounds: Normal heart sounds.   Pulmonary:      Effort: Pulmonary effort is normal.      Breath sounds: Normal breath sounds and air entry.   Abdominal:      General: Bowel sounds are normal. There is no distension.      Palpations: Abdomen is soft.      Tenderness: There is no abdominal tenderness. There is no guarding or rebound.      Hernia: A hernia (soft reducible umbilical hernia) is present.   Musculoskeletal:      Cervical back: Normal range of motion and neck supple.      Right knee: Bony tenderness and crepitus present. Decreased range of motion.    "   Right lower leg: No edema.      Left lower leg: No edema.        Legs:       Comments: Mild swelling right knee, tenderness to palpation medial and lateral joint lines, no warmth    Bilateral-no calf swelling, no pain with palpation of calves   Skin:     General: Skin is warm and dry.      Capillary Refill: Capillary refill takes less than 2 seconds.      Coloration: Skin is not jaundiced.   Neurological:      General: No focal deficit present.      Mental Status: He is alert and oriented to person, place, and time.      Sensory: Sensation is intact.   Psychiatric:         Attention and Perception: Attention normal.         Mood and Affect: Mood and affect normal.         Speech: Speech normal.         Behavior: Behavior normal. Behavior is cooperative.         Thought Content: Thought content normal.         Cognition and Memory: Cognition normal. Memory is impaired.         Judgment: Judgment normal.              Result Review    The following data was reviewed by: DEANN Ascencio on 07/30/2025:  CMP          11/8/2024    11:00 12/10/2024    12:22 7/3/2025    14:33   CMP   Glucose 89  95  81    BUN 10  9  9.0    Creatinine 0.57  0.64  0.77    EGFR 111.5  107.7  101.9    Sodium 140  140  137    Potassium 4.2  3.9  4.2    Chloride 107  105  103    Calcium 8.3  9.2  9.1    Total Protein  8.7  7.4    Albumin  4.0  4.1    Globulin  4.7  3.3    Total Bilirubin  1.3  1.1    Alkaline Phosphatase  92  92    AST (SGOT)  21  13    ALT (SGPT)  10  6    Albumin/Globulin Ratio  0.9  1.2    BUN/Creatinine Ratio 17.5  14.1  11.7    Anion Gap 8.9  11.1  10.6      CBC          12/10/2024    12:22 7/3/2025    14:33   CBC   WBC 12.66  7.85    RBC 5.25  5.42    Hemoglobin 16.2  16.7    Hematocrit 48.5  50.2    MCV 92.4  92.6    MCH 30.9  30.8    MCHC 33.4  33.3    RDW 13.7  13.5    Platelets 348  310      CBC w/diff          12/10/2024    12:22 7/3/2025    14:33   CBC w/Diff   WBC 12.66  7.85    RBC 5.25  5.42    Hemoglobin  16.2  16.7    Hematocrit 48.5  50.2    MCV 92.4  92.6    MCH 30.9  30.8    MCHC 33.4  33.3    RDW 13.7  13.5    Platelets 348  310    Neutrophil Rel % 74.1  62.7    Immature Granulocyte Rel % 0.7  0.4    Lymphocyte Rel % 16.4  24.6    Monocyte Rel % 7.5  10.4    Eosinophil Rel % 1.0  1.3    Basophil Rel % 0.3  0.6      Lipid Panel          7/3/2025    14:33   Lipid Panel   Total Cholesterol 195    Triglycerides 79    HDL Cholesterol 41    VLDL Cholesterol 14    LDL Cholesterol  140    LDL/HDL Ratio 3.37      TSH          7/3/2025    14:33   TSH   TSH 1.760      A1C Last 3 Results          7/3/2025    14:33   HGBA1C Last 3 Results   Hemoglobin A1C 5.42        PSA          7/3/2025    14:33   PSA   PSA 1.430      Data reviewed : Radiologic studies   and Cardiology studies     XR KNEE 3 VW RIGHT     Date of Exam: 7/3/2025 2:19 PM EDT     Indication: Chronic right knee pain     Comparison: Right knee radiographs dated 11/30/2020     Findings:  There is severe medial compartment degenerative joint space narrowing with marginal osteophyte formation and irregularity of the medial right femoral condyle. There is no joint effusion. Bone mineralization is normal. There is peripheral vascular   atherosclerotic calcification. There is mild osteophyte formation within the patellofemoral and lateral compartment of the right knee.     IMPRESSION:  Impression:  Tricompartmental degenerative joint disease of the right knee, most severe within the medial compartment.           Electronically Signed: Alpesh Keane MD    7/8/2025 5:10 PM EDT         Assessment & Plan    Diagnoses and all orders for this visit:    1. Essential hypertension (Primary)    2. PFO (patent foramen ovale)    3. Pure hypercholesterolemia  -     Lipid Panel; Future  -     Hepatic Function Panel; Future    4. History of stroke  -     Lipid Panel; Future  -     Hepatic Function Panel; Future    5. Tobacco use    6. Deep vein thrombosis (DVT) of calf muscle vein  of left lower extremity, unspecified chronicity    7. Hepatitis C virus infection without hepatic coma, unspecified chronicity    8. Chronic pain of right knee  -     acetaminophen (TYLENOL) 500 MG tablet; Take 1 tablet by mouth Every 8 (Eight) Hours As Needed for Mild Pain or Moderate Pain.  Dispense: 60 tablet; Refill: 0    9. Umbilical hernia without obstruction and without gangrene    10. Poor historian       Patient Instructions   Start tylenol and ice for knee pain.  Otherwise Continue current medications and treatment.   Have labs drawn in 2 months.  Call office for lab results if you have not received a call from our office or [employer] message in 1-2 days.    Follow up with specialists as previously advised.  Please call [facility] to set up appt, referral was previously send to them.  Phone number given to Elida.  I recommend tobacco cessation.  [facility] for more resources and information.   Please take blood pressure daily, keep record, bring with you to follow up.   Phone number given to patient's friend to call and set up appointment with different Ortho provider if they choose.  Phone number given to patient's friend to call and set up appointment with neurology as previously referred.  Have ultrasounds and cardiac testing performed as previously scheduled.  Discussed umbilical hernia with patient.  He denies abdominal pain, this is soft and reducible.  Currently declines surgical evaluation.  Recommend surgical evaluation if this grows in size or becomes painful.  Go to the emergency room if severe pain, unable to pass gas or have bowel movement, hard or firm hernia.    I spent 39 minutes caring for Miguel on this date of service. This time includes time spent by me in the following activities:preparing for the visit, reviewing tests, obtaining and/or reviewing a separately obtained history, performing a medically appropriate examination and/or evaluation , counseling and educating  the patient/family/caregiver, ordering medications, tests, or procedures, referring and communicating with other health care professionals , documenting information in the medical record, and care coordination  Follow Up   Return in about 2 months (around 9/30/2025) for Annual physical, Recheck  knee pain.    Patient was given instructions and counseling regarding his condition or for health maintenance advice. Please see specific information pulled into the AVS if appropriate.     Nallely Gloria, DEANN     07/30/25

## 2025-07-31 ENCOUNTER — TELEPHONE (OUTPATIENT)
Dept: ORTHOPEDIC SURGERY | Facility: CLINIC | Age: 60
End: 2025-07-31
Payer: OTHER GOVERNMENT

## 2025-08-05 ENCOUNTER — HOSPITAL ENCOUNTER (OUTPATIENT)
Dept: CARDIOLOGY | Facility: HOSPITAL | Age: 60
Discharge: HOME | End: 2025-08-05
Admitting: NURSE PRACTITIONER
Payer: OTHER GOVERNMENT

## 2025-08-05 DIAGNOSIS — I82.462 ACUTE DEEP VEIN THROMBOSIS (DVT) OF CALF MUSCLE VEIN OF LEFT LOWER EXTREMITY: ICD-10-CM

## 2025-08-05 DIAGNOSIS — Z86.73 HISTORY OF STROKE: ICD-10-CM

## 2025-08-05 DIAGNOSIS — Z72.0 TOBACCO USE: ICD-10-CM

## 2025-08-05 LAB

## 2025-08-05 PROCEDURE — 93970 EXTREMITY STUDY: CPT

## 2025-08-06 ENCOUNTER — RESULTS FOLLOW-UP (OUTPATIENT)
Dept: FAMILY MEDICINE CLINIC | Facility: CLINIC | Age: 60
End: 2025-08-06
Payer: OTHER GOVERNMENT

## 2025-08-13 ENCOUNTER — TELEPHONE (OUTPATIENT)
Dept: ORTHOPEDIC SURGERY | Facility: CLINIC | Age: 60
End: 2025-08-13

## 2025-08-20 ENCOUNTER — HOSPITAL ENCOUNTER (OUTPATIENT)
Dept: CARDIOLOGY | Facility: HOSPITAL | Age: 60
Discharge: HOME | End: 2025-08-20
Payer: OTHER GOVERNMENT

## 2025-08-20 DIAGNOSIS — R94.31 ABNORMAL EKG: ICD-10-CM

## 2025-08-20 DIAGNOSIS — I63.9 CRYPTOGENIC STROKE: ICD-10-CM

## 2025-08-20 DIAGNOSIS — Z86.73 HISTORY OF CVA (CEREBROVASCULAR ACCIDENT): ICD-10-CM

## 2025-08-20 DIAGNOSIS — I10 ESSENTIAL HYPERTENSION: ICD-10-CM

## 2025-08-20 DIAGNOSIS — I21.19 INFERIOR MYOCARDIAL INFARCTION: ICD-10-CM

## 2025-08-20 DIAGNOSIS — E78.5 DYSLIPIDEMIA: ICD-10-CM

## 2025-08-23 DIAGNOSIS — I10 ESSENTIAL HYPERTENSION: ICD-10-CM

## 2025-08-23 DIAGNOSIS — M25.561 CHRONIC PAIN OF RIGHT KNEE: ICD-10-CM

## 2025-08-23 DIAGNOSIS — G89.29 CHRONIC PAIN OF RIGHT KNEE: ICD-10-CM

## 2025-08-25 RX ORDER — ACETAMINOPHEN 500 MG
500 TABLET ORAL EVERY 8 HOURS PRN
Qty: 60 TABLET | Refills: 0 | Status: SHIPPED | OUTPATIENT
Start: 2025-08-25

## 2025-08-25 RX ORDER — LISINOPRIL 10 MG/1
10 TABLET ORAL DAILY
Qty: 90 TABLET | Refills: 0 | Status: SHIPPED | OUTPATIENT
Start: 2025-08-25

## 2025-08-26 ENCOUNTER — HOSPITAL ENCOUNTER (OUTPATIENT)
Dept: CARDIOLOGY | Facility: HOSPITAL | Age: 60
Discharge: HOME | End: 2025-08-26
Payer: OTHER GOVERNMENT

## 2025-08-26 LAB
BH CV REST NUCLEAR ISOTOPE DOSE: 10.6 MCI
BH CV STRESS COMMENTS STAGE 1: NORMAL
BH CV STRESS DOSE REGADENOSON STAGE 1: 0.4
BH CV STRESS DURATION MIN STAGE 1: 0
BH CV STRESS DURATION SEC STAGE 1: 10
BH CV STRESS NUCLEAR ISOTOPE DOSE: 33.4 MCI
BH CV STRESS PROTOCOL 1: NORMAL
BH CV STRESS RECOVERY BP: NORMAL MMHG
BH CV STRESS RECOVERY HR: 98 BPM
BH CV STRESS STAGE 1: 1
MAXIMAL PREDICTED HEART RATE: 158 BPM
SPECT HRT GATED+EF W RNC IV: 79 %
STRESS BASELINE BP: NORMAL MMHG
STRESS BASELINE HR: 62 BPM
STRESS TARGET HR: 134 BPM

## 2025-08-26 PROCEDURE — A9500 TC99M SESTAMIBI: HCPCS | Performed by: INTERNAL MEDICINE

## 2025-08-26 PROCEDURE — 78452 HT MUSCLE IMAGE SPECT MULT: CPT

## 2025-08-26 PROCEDURE — 34310000005 TECHNETIUM SESTAMIBI: Performed by: INTERNAL MEDICINE

## 2025-08-26 PROCEDURE — 93017 CV STRESS TEST TRACING ONLY: CPT

## 2025-08-26 PROCEDURE — 25010000002 REGADENOSON 0.4 MG/5ML SOLUTION: Performed by: INTERNAL MEDICINE

## 2025-08-26 RX ORDER — REGADENOSON 0.08 MG/ML
0.4 INJECTION, SOLUTION INTRAVENOUS
Status: COMPLETED | OUTPATIENT
Start: 2025-08-26 | End: 2025-08-26

## 2025-08-26 RX ADMIN — REGADENOSON 0.4 MG: 0.08 INJECTION, SOLUTION INTRAVENOUS at 09:56

## 2025-08-26 RX ADMIN — TECHNETIUM TC 99M SESTAMIBI 1 DOSE: 1 INJECTION INTRAVENOUS at 09:56

## 2025-08-26 RX ADMIN — TECHNETIUM TC 99M SESTAMIBI 1 DOSE: 1 INJECTION INTRAVENOUS at 08:13

## 2025-08-28 ENCOUNTER — RESULTS FOLLOW-UP (OUTPATIENT)
Dept: CARDIOLOGY | Facility: CLINIC | Age: 60
End: 2025-08-28
Payer: OTHER GOVERNMENT

## 2025-08-29 ENCOUNTER — OFFICE VISIT (OUTPATIENT)
Dept: ORTHOPEDIC SURGERY | Facility: CLINIC | Age: 60
End: 2025-08-29
Payer: OTHER GOVERNMENT

## 2025-08-29 VITALS — OXYGEN SATURATION: 95 % | HEIGHT: 65 IN | BODY MASS INDEX: 27.66 KG/M2 | HEART RATE: 70 BPM | WEIGHT: 166 LBS

## 2025-08-29 DIAGNOSIS — M17.11 PRIMARY OSTEOARTHRITIS OF RIGHT KNEE: Primary | ICD-10-CM

## 2025-08-29 RX ORDER — TRIAMCINOLONE ACETONIDE 40 MG/ML
80 INJECTION, SUSPENSION INTRA-ARTICULAR; INTRAMUSCULAR
Status: COMPLETED | OUTPATIENT
Start: 2025-08-29 | End: 2025-08-29

## 2025-08-29 RX ADMIN — TRIAMCINOLONE ACETONIDE 80 MG: 40 INJECTION, SUSPENSION INTRA-ARTICULAR; INTRAMUSCULAR at 16:55

## (undated) DEVICE — PINNACLE INTRODUCER SHEATH: Brand: PINNACLE

## (undated) DEVICE — GW WWSS35145 WHOLEY WIRE V04: Brand: WHOLEY™

## (undated) DEVICE — GW XCHG AMPLTZ XSTIF PTFE CRV .035 3X260

## (undated) DEVICE — CATH DIAG IMPULSE MPA2 5F 125CM

## (undated) DEVICE — ST ACC MICROPUNCTURE STFF/CANN PLAT/TP 4F 21G 40CM

## (undated) DEVICE — SOUNDSTAR ECO 8F G CATHETER: Brand: SOUNDSTAR